# Patient Record
Sex: MALE | Race: BLACK OR AFRICAN AMERICAN | URBAN - METROPOLITAN AREA
[De-identification: names, ages, dates, MRNs, and addresses within clinical notes are randomized per-mention and may not be internally consistent; named-entity substitution may affect disease eponyms.]

---

## 2021-08-10 ENCOUNTER — APPOINTMENT (OUTPATIENT)
Dept: CT IMAGING | Age: 56
DRG: 720 | End: 2021-08-10
Payer: MEDICAID

## 2021-08-10 ENCOUNTER — HOSPITAL ENCOUNTER (INPATIENT)
Age: 56
LOS: 3 days | Discharge: HOME | DRG: 720 | End: 2021-08-13
Attending: EMERGENCY MEDICINE | Admitting: INTERNAL MEDICINE
Payer: MEDICAID

## 2021-08-10 ENCOUNTER — APPOINTMENT (OUTPATIENT)
Dept: ULTRASOUND IMAGING | Age: 56
DRG: 720 | End: 2021-08-10
Payer: MEDICAID

## 2021-08-10 DIAGNOSIS — N45.3 ORCHITIS AND EPIDIDYMITIS: Primary | ICD-10-CM

## 2021-08-10 DIAGNOSIS — N50.811 PAIN IN RIGHT TESTICLE: ICD-10-CM

## 2021-08-10 DIAGNOSIS — A41.9 SEPTICEMIA (HCC): ICD-10-CM

## 2021-08-10 LAB
A/G RATIO: 0.8 (ref 1.1–2.2)
ALBUMIN SERPL-MCNC: 3.5 G/DL (ref 3.4–5)
ALP BLD-CCNC: 99 U/L (ref 40–129)
ALT SERPL-CCNC: 10 U/L (ref 10–40)
AMPHETAMINE SCREEN, URINE: ABNORMAL
ANION GAP SERPL CALCULATED.3IONS-SCNC: 12 MMOL/L (ref 3–16)
AST SERPL-CCNC: 17 U/L (ref 15–37)
BACTERIA: ABNORMAL /HPF
BARBITURATE SCREEN URINE: ABNORMAL
BASOPHILS ABSOLUTE: 0.1 K/UL (ref 0–0.2)
BASOPHILS RELATIVE PERCENT: 0.3 %
BENZODIAZEPINE SCREEN, URINE: ABNORMAL
BILIRUB SERPL-MCNC: 0.3 MG/DL (ref 0–1)
BILIRUBIN URINE: NEGATIVE
BLOOD, URINE: ABNORMAL
BUN BLDV-MCNC: 7 MG/DL (ref 7–20)
CALCIUM SERPL-MCNC: 8.6 MG/DL (ref 8.3–10.6)
CANNABINOID SCREEN URINE: ABNORMAL
CHLORIDE BLD-SCNC: 98 MMOL/L (ref 99–110)
CLARITY: CLEAR
CO2: 22 MMOL/L (ref 21–32)
COCAINE METABOLITE SCREEN URINE: POSITIVE
COLOR: YELLOW
CREAT SERPL-MCNC: 0.7 MG/DL (ref 0.9–1.3)
EOSINOPHILS ABSOLUTE: 0.1 K/UL (ref 0–0.6)
EOSINOPHILS RELATIVE PERCENT: 0.5 %
EPITHELIAL CELLS, UA: 1 /HPF (ref 0–5)
GFR AFRICAN AMERICAN: >60
GFR NON-AFRICAN AMERICAN: >60
GLOBULIN: 4.5 G/DL
GLUCOSE BLD-MCNC: 107 MG/DL (ref 70–99)
GLUCOSE URINE: NEGATIVE MG/DL
HCT VFR BLD CALC: 36.5 % (ref 40.5–52.5)
HEMOGLOBIN: 12.1 G/DL (ref 13.5–17.5)
HYALINE CASTS: 1 /LPF (ref 0–8)
KETONES, URINE: NEGATIVE MG/DL
LACTIC ACID: 0.9 MMOL/L (ref 0.4–2)
LEUKOCYTE ESTERASE, URINE: ABNORMAL
LIPASE: 10 U/L (ref 13–60)
LYMPHOCYTES ABSOLUTE: 1 K/UL (ref 1–5.1)
LYMPHOCYTES RELATIVE PERCENT: 5.5 %
Lab: ABNORMAL
MCH RBC QN AUTO: 27.4 PG (ref 26–34)
MCHC RBC AUTO-ENTMCNC: 33 G/DL (ref 31–36)
MCV RBC AUTO: 83 FL (ref 80–100)
METHADONE SCREEN, URINE: ABNORMAL
MICROSCOPIC EXAMINATION: YES
MONOCYTES ABSOLUTE: 1.1 K/UL (ref 0–1.3)
MONOCYTES RELATIVE PERCENT: 5.8 %
NEUTROPHILS ABSOLUTE: 16.3 K/UL (ref 1.7–7.7)
NEUTROPHILS RELATIVE PERCENT: 87.9 %
NITRITE, URINE: NEGATIVE
OPIATE SCREEN URINE: ABNORMAL
OXYCODONE URINE: ABNORMAL
PDW BLD-RTO: 19.5 % (ref 12.4–15.4)
PH UA: 5
PH UA: 6 (ref 5–8)
PHENCYCLIDINE SCREEN URINE: ABNORMAL
PLATELET # BLD: 332 K/UL (ref 135–450)
PMV BLD AUTO: 7.7 FL (ref 5–10.5)
POTASSIUM REFLEX MAGNESIUM: 3.7 MMOL/L (ref 3.5–5.1)
PROPOXYPHENE SCREEN: ABNORMAL
PROTEIN UA: NEGATIVE MG/DL
RBC # BLD: 4.4 M/UL (ref 4.2–5.9)
RBC UA: 2 /HPF (ref 0–4)
SODIUM BLD-SCNC: 132 MMOL/L (ref 136–145)
SPECIFIC GRAVITY UA: 1.01 (ref 1–1.03)
TOTAL PROTEIN: 8 G/DL (ref 6.4–8.2)
URINE REFLEX TO CULTURE: YES
URINE TYPE: ABNORMAL
UROBILINOGEN, URINE: 0.2 E.U./DL
WBC # BLD: 18.5 K/UL (ref 4–11)
WBC UA: 16 /HPF (ref 0–5)

## 2021-08-10 PROCEDURE — 87086 URINE CULTURE/COLONY COUNT: CPT

## 2021-08-10 PROCEDURE — 2000000000 HC ICU R&B

## 2021-08-10 PROCEDURE — 6370000000 HC RX 637 (ALT 250 FOR IP): Performed by: EMERGENCY MEDICINE

## 2021-08-10 PROCEDURE — 2580000003 HC RX 258: Performed by: EMERGENCY MEDICINE

## 2021-08-10 PROCEDURE — 74177 CT ABD & PELVIS W/CONTRAST: CPT

## 2021-08-10 PROCEDURE — 83605 ASSAY OF LACTIC ACID: CPT

## 2021-08-10 PROCEDURE — 87591 N.GONORRHOEAE DNA AMP PROB: CPT

## 2021-08-10 PROCEDURE — 87040 BLOOD CULTURE FOR BACTERIA: CPT

## 2021-08-10 PROCEDURE — 6360000002 HC RX W HCPCS: Performed by: EMERGENCY MEDICINE

## 2021-08-10 PROCEDURE — 87150 DNA/RNA AMPLIFIED PROBE: CPT

## 2021-08-10 PROCEDURE — 36415 COLL VENOUS BLD VENIPUNCTURE: CPT

## 2021-08-10 PROCEDURE — 96375 TX/PRO/DX INJ NEW DRUG ADDON: CPT

## 2021-08-10 PROCEDURE — 93975 VASCULAR STUDY: CPT

## 2021-08-10 PROCEDURE — 6360000002 HC RX W HCPCS: Performed by: INTERNAL MEDICINE

## 2021-08-10 PROCEDURE — 85025 COMPLETE CBC W/AUTO DIFF WBC: CPT

## 2021-08-10 PROCEDURE — 87186 SC STD MICRODIL/AGAR DIL: CPT

## 2021-08-10 PROCEDURE — 76870 US EXAM SCROTUM: CPT

## 2021-08-10 PROCEDURE — 87077 CULTURE AEROBIC IDENTIFY: CPT

## 2021-08-10 PROCEDURE — 6360000004 HC RX CONTRAST MEDICATION: Performed by: EMERGENCY MEDICINE

## 2021-08-10 PROCEDURE — 99284 EMERGENCY DEPT VISIT MOD MDM: CPT

## 2021-08-10 PROCEDURE — 80307 DRUG TEST PRSMV CHEM ANLYZR: CPT

## 2021-08-10 PROCEDURE — 81001 URINALYSIS AUTO W/SCOPE: CPT

## 2021-08-10 PROCEDURE — 83690 ASSAY OF LIPASE: CPT

## 2021-08-10 PROCEDURE — 80053 COMPREHEN METABOLIC PANEL: CPT

## 2021-08-10 PROCEDURE — 96365 THER/PROPH/DIAG IV INF INIT: CPT

## 2021-08-10 PROCEDURE — 87491 CHLMYD TRACH DNA AMP PROBE: CPT

## 2021-08-10 RX ORDER — LEVOFLOXACIN 5 MG/ML
500 INJECTION, SOLUTION INTRAVENOUS ONCE
Status: COMPLETED | OUTPATIENT
Start: 2021-08-10 | End: 2021-08-10

## 2021-08-10 RX ORDER — OXYCODONE HYDROCHLORIDE 5 MG/1
5 TABLET ORAL EVERY 6 HOURS PRN
Status: CANCELLED | OUTPATIENT
Start: 2021-08-10

## 2021-08-10 RX ORDER — ACETAMINOPHEN 500 MG
1000 TABLET ORAL ONCE
Status: COMPLETED | OUTPATIENT
Start: 2021-08-10 | End: 2021-08-10

## 2021-08-10 RX ORDER — KETOROLAC TROMETHAMINE 30 MG/ML
30 INJECTION, SOLUTION INTRAMUSCULAR; INTRAVENOUS EVERY 6 HOURS PRN
Status: DISCONTINUED | OUTPATIENT
Start: 2021-08-10 | End: 2021-08-13 | Stop reason: HOSPADM

## 2021-08-10 RX ORDER — ONDANSETRON 2 MG/ML
4 INJECTION INTRAMUSCULAR; INTRAVENOUS
Status: DISCONTINUED | OUTPATIENT
Start: 2021-08-10 | End: 2021-08-11 | Stop reason: HOSPADM

## 2021-08-10 RX ORDER — MORPHINE SULFATE 2 MG/ML
2 INJECTION, SOLUTION INTRAMUSCULAR; INTRAVENOUS EVERY 4 HOURS PRN
Status: CANCELLED | OUTPATIENT
Start: 2021-08-10 | End: 2021-08-13

## 2021-08-10 RX ORDER — LEVOFLOXACIN 5 MG/ML
250 INJECTION, SOLUTION INTRAVENOUS ONCE
Status: COMPLETED | OUTPATIENT
Start: 2021-08-10 | End: 2021-08-10

## 2021-08-10 RX ORDER — SODIUM CHLORIDE 9 MG/ML
30 INJECTION, SOLUTION INTRAVENOUS ONCE
Status: COMPLETED | OUTPATIENT
Start: 2021-08-10 | End: 2021-08-10

## 2021-08-10 RX ORDER — MORPHINE SULFATE 4 MG/ML
4 INJECTION, SOLUTION INTRAMUSCULAR; INTRAVENOUS
Status: DISCONTINUED | OUTPATIENT
Start: 2021-08-10 | End: 2021-08-10

## 2021-08-10 RX ADMIN — LEVOFLOXACIN 500 MG: 500 INJECTION, SOLUTION INTRAVENOUS at 20:28

## 2021-08-10 RX ADMIN — ACETAMINOPHEN 1000 MG: 500 TABLET, FILM COATED ORAL at 18:56

## 2021-08-10 RX ADMIN — Medication 1000 MG: at 22:55

## 2021-08-10 RX ADMIN — LEVOFLOXACIN 250 MG: 5 INJECTION, SOLUTION INTRAVENOUS at 22:56

## 2021-08-10 RX ADMIN — Medication 1000 MG: at 20:22

## 2021-08-10 RX ADMIN — SODIUM CHLORIDE 2286 ML: 9 INJECTION, SOLUTION INTRAVENOUS at 18:54

## 2021-08-10 RX ADMIN — ONDANSETRON 4 MG: 2 INJECTION INTRAMUSCULAR; INTRAVENOUS at 18:55

## 2021-08-10 RX ADMIN — IOPAMIDOL 75 ML: 755 INJECTION, SOLUTION INTRAVENOUS at 20:03

## 2021-08-10 ASSESSMENT — PAIN SCALES - GENERAL
PAINLEVEL_OUTOF10: 10
PAINLEVEL_OUTOF10: 10

## 2021-08-10 NOTE — ED NOTES
Bed: 12  Expected date:   Expected time:   Means of arrival:   Comments:  chad Viramontes RN  08/10/21 6444

## 2021-08-10 NOTE — ED PROVIDER NOTES
[place] [address] [facility]:    [address] in the Last Year:    Transportation Needs:     Lack of Transportation (Medical):  Lack of Transportation (Non-Medical):    Physical Activity:     Days of Exercise per Week:     Minutes of Exercise per Session:    Stress:     Feeling of Stress :    Social Connections:     Frequency of Communication with Friends and Family:     Frequency of Social Gatherings with Friends and Family:     Attends Orthodoxy Services:     Active Member of Clubs or Organizations:     Attends Club or Organization Meetings:     Marital Status:    Intimate Partner Violence:     Fear of Current or Ex-Partner:     Emotionally Abused:     Physically Abused:     Sexually Abused:      Current Facility-Administered Medications   Medication Dose Route Frequency Provider Last Rate Last Admin    morphine injection 4 mg  4 mg Intravenous Q1H PRN Alesha Garcias MD        ondansetron [facility] injection 4 mg  4 mg Intravenous Q1H PRN Alesha Garcias MD   4 mg at 08/10/21 1855    levoFLOXacin (LEVAQUIN) 500 MG/100ML infusion 500 mg  500 mg Intravenous Once Alesha Garcias  mL/hr at 08/10/21 2028 500 mg at 08/10/21 2028     Current Outpatient Medications   Medication Sig Dispense Refill    Lansoprazole (PREVACID PO) Take by mouth       No Known Allergies    REVIEW OF SYSTEMS  10 systems reviewed, pertinent positives per HPI otherwise noted to be negative. PHYSICAL EXAM  /74   Pulse 102   Temp 99.4 °F (37.4 °C) (Oral)   Resp 15   Ht 5' 7\" (1.702 m)   Wt 168 lb (76.2 kg)   SpO2 96%   BMI 26.31 kg/m²    GENERAL APPEARANCE: Awake and alert. Cooperative. Uncomfortable appearing, mild distress. HENT: Normocephalic. Atraumatic. Mucous membranes are dry. NECK: Supple. EYES: PERRL. EOM's grossly intact. HEART/CHEST: Reg rhythm, tachycardia. No murmurs. No chest wall tenderness. LUNGS: Respirations unlabored. CTAB.  Good air exchange. Speaking comfortably in full sentences. ABDOMEN: Mild RLQ ttp and R flank/CVA ttp but no RUQ or any left sided abdominal flank or back tenderness. Soft. Non-distended. No masses. No organomegaly. No guarding or rebound. Normal bowel sounds throughout. : Right testicle is notably swollen tender and inflamed compared to the left with grossly normal lie. Positive cremasteric bilaterally. Left testicle is nontender and without mass. Right hemiscrotum is edematous and erythematous. There is inguinal lymphadenopathy present on the right. No inguinal hernias appreciated on exam.  MUSCULOSKELETAL: No extremity edema. Compartments soft. No deformity. No tenderness in the extremities. All extremities neurovascularly intact. SKIN: Warm and dry. No acute rashes. NEUROLOGICAL: Alert and oriented. CN's 2-12 intact. No gross facial drooping. Strength 5/5, sensation intact. 2 plus DTR's in knees bilaterally. Gait normal.  PSYCHIATRIC: Normal mood and affect. LABS  I have reviewed all labs for this visit.    Results for orders placed or performed during the hospital encounter of 08/10/21   Urinalysis Reflex to Culture    Specimen: Urine, clean catch   Result Value Ref Range    Color, UA YELLOW Straw/Yellow    Clarity, UA Clear Clear    Glucose, Ur Negative Negative mg/dL    Bilirubin Urine Negative Negative    Ketones, Urine Negative Negative mg/dL    Specific Gravity, UA 1.007 1.005 - 1.030    Blood, Urine SMALL (A) Negative    pH, UA 6.0 5.0 - 8.0    Protein, UA Negative Negative mg/dL    Urobilinogen, Urine 0.2 <2.0 E.U./dL    Nitrite, Urine Negative Negative    Leukocyte Esterase, Urine MODERATE (A) Negative    Microscopic Examination YES     Urine Type NotGiven     Urine Reflex to Culture Yes    CBC auto differential   Result Value Ref Range    WBC 18.5 (H) 4.0 - 11.0 K/uL    RBC 4.40 4.20 - 5.90 M/uL    Hemoglobin 12.1 (L) 13.5 - 17.5 g/dL    Hematocrit 36.5 (L) 40.5 - 52.5 %    MCV 83.0 80.0 - 100.0 fL    MCH 27.4 26.0 - 34.0 pg    MCHC 33.0 31.0 - 36.0 g/dL    RDW 19.5 (H) 12.4 - 15.4 %    Platelets 736 546 - 718 K/uL    MPV 7.7 5.0 - 10.5 fL    Neutrophils % 87.9 %    Lymphocytes % 5.5 %    Monocytes % 5.8 %    Eosinophils % 0.5 %    Basophils % 0.3 %    Neutrophils Absolute 16.3 (H) 1.7 - 7.7 K/uL    Lymphocytes Absolute 1.0 1.0 - 5.1 K/uL    Monocytes Absolute 1.1 0.0 - 1.3 K/uL    Eosinophils Absolute 0.1 0.0 - 0.6 K/uL    Basophils Absolute 0.1 0.0 - 0.2 K/uL   Comprehensive Metabolic Panel w/ Reflex to MG   Result Value Ref Range    Sodium 132 (L) 136 - 145 mmol/L    Potassium reflex Magnesium 3.7 3.5 - 5.1 mmol/L    Chloride 98 (L) 99 - 110 mmol/L    CO2 22 21 - 32 mmol/L    Anion Gap 12 3 - 16    Glucose 107 (H) 70 - 99 mg/dL    BUN 7 7 - 20 mg/dL    CREATININE 0.7 (L) 0.9 - 1.3 mg/dL    GFR Non-African American >60 >60    GFR African American >60 >60    Calcium 8.6 8.3 - 10.6 mg/dL    Total Protein 8.0 6.4 - 8.2 g/dL    Albumin 3.5 3.4 - 5.0 g/dL    Albumin/Globulin Ratio 0.8 (L) 1.1 - 2.2    Total Bilirubin 0.3 0.0 - 1.0 mg/dL    Alkaline Phosphatase 99 40 - 129 U/L    ALT 10 10 - 40 U/L    AST 17 15 - 37 U/L    Globulin 4.5 g/dL   Lipase   Result Value Ref Range    Lipase 10.0 (L) 13.0 - 60.0 U/L   Lactic Acid, Plasma   Result Value Ref Range    Lactic Acid 0.9 0.4 - 2.0 mmol/L   Microscopic Urinalysis   Result Value Ref Range    Bacteria, UA 2+ (A) None Seen /HPF    Hyaline Casts, UA 1 0 - 8 /LPF    WBC, UA 16 (H) 0 - 5 /HPF    RBC, UA 2 0 - 4 /HPF    Epithelial Cells, UA 1 0 - 5 /HPF       RADIOLOGY    US SCROTUM AND TESTICLES    Result Date: 8/10/2021  EXAMINATION: DOPPLER EVALUATION OF THE PELVIS; ULTRASOUND OF THE SCROTUM/TESTICLES WITH COLOR DOPPLER FLOW EVALUATION 8/10/2021 COMPARISON: None.  HISTORY: ORDERING SYSTEM PROVIDED HISTORY: R testicle pain and fever x2-3 days TECHNOLOGIST PROVIDED HISTORY: Reason for exam:->R testicle pain and fever x2-3 days; ORDERING SYSTEM PROVIDED HISTORY: R testicle pain and fever TECHNOLOGIST PROVIDED HISTORY: Reason for exam:->R testicle pain and fever FINDINGS: Measurements: Right testicle: 4.6 x 3.1 x 3.5 cm Left testicle: 4.1 x 2.9 x 3.1 cm Right: (Identified is more anterior) Grey scale:  No mass is identified. Doppler Evaluation:  Doppler flow is increased. Scrotal Sac: There is a small hydrocele. Epididymis: The epididymis is swollen with moderate hypervascularity. Left: (Identified is more posterior) Grey scale: The left testicle demonstrates normal homogeneous echotexture without focal lesion. No evidence of testicular microlithiasis. Doppler Evaluation:  There is normal arterial and venous Doppler flow within the testicle. Scrotal Sac: There is a small hydrocele. Epididymis:  No acute abnormality. No intratesticular masses. Findings consistent with right epididymo-orchitis. Small bilateral hydroceles. CT ABDOMEN PELVIS W IV CONTRAST Additional Contrast? None    Result Date: 8/10/2021  EXAMINATION: CT OF THE ABDOMEN AND PELVIS WITH CONTRAST 8/10/2021 7:56 pm TECHNIQUE: CT of the abdomen and pelvis was performed with the administration of intravenous contrast. Multiplanar reformatted images are provided for review. Dose modulation, iterative reconstruction, and/or weight based adjustment of the mA/kV was utilized to reduce the radiation dose to as low as reasonably achievable. COMPARISON: None. HISTORY: ORDERING SYSTEM PROVIDED HISTORY: fever, R groin/testicle pain TECHNOLOGIST PROVIDED HISTORY: Additional Contrast?->None Reason for exam:->fever, R groin/testicle pain Decision Support Exception - unselect if not a suspected or confirmed emergency medical condition->Emergency Medical Condition (MA) Reason for Exam: fever, R groin/testicle pain Acuity: Acute Type of Exam: Initial FINDINGS: Lower Chest: Air trapping is noted in the lung bases. There is mild cardiomegaly. Organs: The liver is homogeneous and normal in attenuation.   No gallbladder stones are seen. The pancreas, spleen and adrenal glands are unremarkable. The nephrograms are symmetrical.  There is no hydronephrosis or obstructing calculus. Cyst-like hypodensities in the kidneys measure up to 7 mm. No suspicious lesions are seen. GI/Bowel: The stomach is unremarkable. There is mild dilation of jejunal loops, without focal area of transition. There is mild diverticulosis of the descending and sigmoid colon. No pericolonic edema is seen. Pelvis: Urinary bladder is unremarkable. Prostate gland is mildly enlarged. Peritoneum/Retroperitoneum: There is no aneurysm, adenopathy or free fluid. Bones/Soft Tissues: At L5-S1 there is grade 1 spondylolisthesis with bilateral L5 pars defects. There is disproportionate spondylosis at this level. No acute fractures are identified. Abdominal wall is unremarkable. Mild dilation the proximal jejunum. This is nonspecific focal ileus versus enteritis. No urolithiasis or obstructive uropathy. Cardiomegaly. Mild enlargement of the prostate gland. US DUP ABD PEL RETRO SCROT COMPLETE    Result Date: 8/10/2021  EXAMINATION: DOPPLER EVALUATION OF THE PELVIS; ULTRASOUND OF THE SCROTUM/TESTICLES WITH COLOR DOPPLER FLOW EVALUATION 8/10/2021 COMPARISON: None. HISTORY: ORDERING SYSTEM PROVIDED HISTORY: R testicle pain and fever x2-3 days TECHNOLOGIST PROVIDED HISTORY: Reason for exam:->R testicle pain and fever x2-3 days; ORDERING SYSTEM PROVIDED HISTORY: R testicle pain and fever TECHNOLOGIST PROVIDED HISTORY: Reason for exam:->R testicle pain and fever FINDINGS: Measurements: Right testicle: 4.6 x 3.1 x 3.5 cm Left testicle: 4.1 x 2.9 x 3.1 cm Right: (Identified is more anterior) Grey scale:  No mass is identified. Doppler Evaluation:  Doppler flow is increased. Scrotal Sac: There is a small hydrocele. Epididymis: The epididymis is swollen with moderate hypervascularity. Left: (Identified is more posterior) Grey scale:   The left testicle demonstrates normal homogeneous echotexture without focal lesion. No evidence of testicular microlithiasis. Doppler Evaluation:  There is normal arterial and venous Doppler flow within the testicle. Scrotal Sac: There is a small hydrocele. Epididymis:  No acute abnormality. No intratesticular masses. Findings consistent with right epididymo-orchitis. Small bilateral hydroceles. ED COURSE/[facility]  Patient seen and evaluated. Old records reviewed. Labs and imaging reviewed and results discussed with patient. After initial evaluation, differential diagnostic considerations included: UTI, sexually transmitted infection, epididymitis, balanitis, prostatitis, inguinal hernia, testicular torsion, varicocele, hydrocele, appy    The patient's ED workup was notable for concern for right epididymitis/orchitis without torsion, no abnormal mass. Patient had significant fever, leukocytosis and infected appearing urinalysis although patient denies any concern for STD. Gonorrhea chlamydia cultures are pending. No symptoms on the left testicle currently. CAT scan is also unremarkable but ultrasound does demonstrate the above-mentioned findings. Patient has a normal lactate and stable blood pressure with no other endorgan dysfunction. Covering with rocephin/levaquin for now. Urology will be consulted as well.     SEP-1 CORE MEASURE DATA    Classification: exclude from core measure    Exclusion criteria: the patient is NOT to be included for sepsis due to:  Patient has sepsis and no end-organ dysfunction is identified and so is not to be included    During the patient's ED course, the patient was given:  Medications   morphine injection 4 mg (0 mg Intravenous Held 8/10/21 1855)   ondansetron (ZOFRAN) injection 4 mg (4 mg Intravenous Given 8/10/21 1855)   levoFLOXacin (LEVAQUIN) 500 MG/100ML infusion 500 mg (500 mg Intravenous New Bag 8/10/21 2028)   0.9 % sodium chloride infusion (2,286 mLs Intravenous New Bag 8/10/21 1854) acetaminophen (TYLENOL) tablet 1,000 mg (1,000 mg Oral Given 8/10/21 1856)   iopamidol (ISOVUE-370) 76 % injection 75 mL (75 mLs Intravenous Given 8/10/21 2003)   cefTRIAXone (ROCEPHIN) 1000 mg in sterile water 10 mL IV syringe (1,000 mg Intravenous Given 8/10/21 2022)        CLINICAL IMPRESSION  1. Orchitis and epididymitis    2. Pain in right testicle    3. Septicemia (HCC)        Blood pressure 130/74, pulse 102, temperature 99.4 °F (37.4 °C), temperature source Oral, resp. rate 15, height 5' 7\" (1.702 m), weight 168 lb (76.2 kg), SpO2 96 %. DISPOSITION  Venus Boeck was admitted in fair condition. The plan is to admit to the hospital at this time under the hospitalist service. Hospitalist accepted the patient and will take over the patient's care. DISCLAIMER: This chart was created using Dragon dictation software. Efforts were made by me to ensure accuracy, however some errors may be present due to limitations of this technology and occasionally words are not transcribed correctly.         Efraín Goode MD  08/10/21 2750

## 2021-08-11 LAB
A/G RATIO: 0.7 (ref 1.1–2.2)
ALBUMIN SERPL-MCNC: 3 G/DL (ref 3.4–5)
ALP BLD-CCNC: 93 U/L (ref 40–129)
ALT SERPL-CCNC: 7 U/L (ref 10–40)
ANION GAP SERPL CALCULATED.3IONS-SCNC: 11 MMOL/L (ref 3–16)
AST SERPL-CCNC: 12 U/L (ref 15–37)
BASE EXCESS ARTERIAL: -1 (ref -3–3)
BASOPHILS ABSOLUTE: 0.1 K/UL (ref 0–0.2)
BASOPHILS RELATIVE PERCENT: 0.3 %
BILIRUB SERPL-MCNC: 0.5 MG/DL (ref 0–1)
BUN BLDV-MCNC: 4 MG/DL (ref 7–20)
C-REACTIVE PROTEIN: 210.1 MG/L (ref 0–5.1)
C. TRACHOMATIS DNA ,URINE: NEGATIVE
CALCIUM IONIZED: 1.18 MMOL/L (ref 1.12–1.32)
CALCIUM SERPL-MCNC: 8.5 MG/DL (ref 8.3–10.6)
CHLORIDE BLD-SCNC: 105 MMOL/L (ref 99–110)
CO2: 23 MMOL/L (ref 21–32)
CREAT SERPL-MCNC: 0.6 MG/DL (ref 0.9–1.3)
EOSINOPHILS ABSOLUTE: 0.1 K/UL (ref 0–0.6)
EOSINOPHILS RELATIVE PERCENT: 0.2 %
GFR AFRICAN AMERICAN: >60
GFR NON-AFRICAN AMERICAN: >60
GLOBULIN: 4.2 G/DL
GLUCOSE BLD-MCNC: 89 MG/DL (ref 70–99)
GLUCOSE BLD-MCNC: 94 MG/DL (ref 70–99)
HBV SURFACE AB TITR SER: <3.5 MIU/ML
HCO3 ARTERIAL: 24 MMOL/L (ref 21–29)
HCT VFR BLD CALC: 34.4 % (ref 40.5–52.5)
HEMOGLOBIN: 11.1 G/DL (ref 13.5–17.5)
HEMOGLOBIN: 13.1 GM/DL (ref 13.5–17.5)
LACTATE: 0.71 MMOL/L (ref 0.4–2)
LACTIC ACID: 1.2 MMOL/L (ref 0.4–2)
LYMPHOCYTES ABSOLUTE: 1.7 K/UL (ref 1–5.1)
LYMPHOCYTES RELATIVE PERCENT: 4.5 %
MCH RBC QN AUTO: 27 PG (ref 26–34)
MCHC RBC AUTO-ENTMCNC: 32.3 G/DL (ref 31–36)
MCV RBC AUTO: 83.5 FL (ref 80–100)
MONOCYTES ABSOLUTE: 1.8 K/UL (ref 0–1.3)
MONOCYTES RELATIVE PERCENT: 4.8 %
N. GONORRHOEAE DNA, URINE: NEGATIVE
NEUTROPHILS ABSOLUTE: 33.9 K/UL (ref 1.7–7.7)
NEUTROPHILS RELATIVE PERCENT: 90.2 %
O2 SAT, ARTERIAL: 96 % (ref 93–100)
PCO2 ARTERIAL: 38.5 MM HG (ref 35–45)
PDW BLD-RTO: 18.8 % (ref 12.4–15.4)
PERFORMED ON: ABNORMAL
PH ARTERIAL: 7.4 (ref 7.35–7.45)
PLATELET # BLD: 322 K/UL (ref 135–450)
PMV BLD AUTO: 7 FL (ref 5–10.5)
PO2 ARTERIAL: 82.9 MM HG (ref 75–108)
POC HEMATOCRIT: 38 % (ref 40.5–52.5)
POC POTASSIUM: 3.5 MMOL/L (ref 3.5–5.1)
POC SAMPLE TYPE: ABNORMAL
POC SODIUM: 142 MMOL/L (ref 136–145)
POTASSIUM REFLEX MAGNESIUM: 3.7 MMOL/L (ref 3.5–5.1)
RBC # BLD: 4.12 M/UL (ref 4.2–5.9)
SEDIMENTATION RATE, ERYTHROCYTE: 98 MM/HR (ref 0–20)
SODIUM BLD-SCNC: 139 MMOL/L (ref 136–145)
TCO2 ARTERIAL: 25 MMOL/L
TOTAL PROTEIN: 7.2 G/DL (ref 6.4–8.2)
WBC # BLD: 37.5 K/UL (ref 4–11)

## 2021-08-11 PROCEDURE — 6360000002 HC RX W HCPCS: Performed by: INTERNAL MEDICINE

## 2021-08-11 PROCEDURE — 90471 IMMUNIZATION ADMIN: CPT | Performed by: INTERNAL MEDICINE

## 2021-08-11 PROCEDURE — 85014 HEMATOCRIT: CPT

## 2021-08-11 PROCEDURE — 99255 IP/OBS CONSLTJ NEW/EST HI 80: CPT | Performed by: INTERNAL MEDICINE

## 2021-08-11 PROCEDURE — 90715 TDAP VACCINE 7 YRS/> IM: CPT | Performed by: INTERNAL MEDICINE

## 2021-08-11 PROCEDURE — 6370000000 HC RX 637 (ALT 250 FOR IP): Performed by: INTERNAL MEDICINE

## 2021-08-11 PROCEDURE — 84132 ASSAY OF SERUM POTASSIUM: CPT

## 2021-08-11 PROCEDURE — 84295 ASSAY OF SERUM SODIUM: CPT

## 2021-08-11 PROCEDURE — 85652 RBC SED RATE AUTOMATED: CPT

## 2021-08-11 PROCEDURE — 82803 BLOOD GASES ANY COMBINATION: CPT

## 2021-08-11 PROCEDURE — 36415 COLL VENOUS BLD VENIPUNCTURE: CPT

## 2021-08-11 PROCEDURE — 2580000003 HC RX 258: Performed by: INTERNAL MEDICINE

## 2021-08-11 PROCEDURE — 80053 COMPREHEN METABOLIC PANEL: CPT

## 2021-08-11 PROCEDURE — 86803 HEPATITIS C AB TEST: CPT

## 2021-08-11 PROCEDURE — 86780 TREPONEMA PALLIDUM: CPT

## 2021-08-11 PROCEDURE — 85025 COMPLETE CBC W/AUTO DIFF WBC: CPT

## 2021-08-11 PROCEDURE — 2000000000 HC ICU R&B

## 2021-08-11 PROCEDURE — 2700000000 HC OXYGEN THERAPY PER DAY

## 2021-08-11 PROCEDURE — 83605 ASSAY OF LACTIC ACID: CPT

## 2021-08-11 PROCEDURE — 86702 HIV-2 ANTIBODY: CPT

## 2021-08-11 PROCEDURE — 99223 1ST HOSP IP/OBS HIGH 75: CPT | Performed by: INTERNAL MEDICINE

## 2021-08-11 PROCEDURE — 86701 HIV-1ANTIBODY: CPT

## 2021-08-11 PROCEDURE — 94761 N-INVAS EAR/PLS OXIMETRY MLT: CPT

## 2021-08-11 PROCEDURE — 87390 HIV-1 AG IA: CPT

## 2021-08-11 PROCEDURE — 82947 ASSAY GLUCOSE BLOOD QUANT: CPT

## 2021-08-11 PROCEDURE — 86706 HEP B SURFACE ANTIBODY: CPT

## 2021-08-11 PROCEDURE — 82330 ASSAY OF CALCIUM: CPT

## 2021-08-11 PROCEDURE — 86140 C-REACTIVE PROTEIN: CPT

## 2021-08-11 RX ORDER — MAGNESIUM HYDROXIDE/ALUMINUM HYDROXICE/SIMETHICONE 120; 1200; 1200 MG/30ML; MG/30ML; MG/30ML
30 SUSPENSION ORAL EVERY 6 HOURS PRN
Status: DISCONTINUED | OUTPATIENT
Start: 2021-08-11 | End: 2021-08-13 | Stop reason: HOSPADM

## 2021-08-11 RX ORDER — CALCIUM CARBONATE 200(500)MG
500 TABLET,CHEWABLE ORAL 3 TIMES DAILY PRN
Status: DISCONTINUED | OUTPATIENT
Start: 2021-08-11 | End: 2021-08-13 | Stop reason: HOSPADM

## 2021-08-11 RX ORDER — ACETAMINOPHEN 650 MG/1
650 SUPPOSITORY RECTAL EVERY 6 HOURS PRN
Status: DISCONTINUED | OUTPATIENT
Start: 2021-08-11 | End: 2021-08-13 | Stop reason: HOSPADM

## 2021-08-11 RX ORDER — SODIUM CHLORIDE 0.9 % (FLUSH) 0.9 %
5-40 SYRINGE (ML) INJECTION EVERY 12 HOURS SCHEDULED
Status: DISCONTINUED | OUTPATIENT
Start: 2021-08-11 | End: 2021-08-13 | Stop reason: HOSPADM

## 2021-08-11 RX ORDER — LEVOFLOXACIN 5 MG/ML
750 INJECTION, SOLUTION INTRAVENOUS EVERY 24 HOURS
Status: DISCONTINUED | OUTPATIENT
Start: 2021-08-11 | End: 2021-08-11

## 2021-08-11 RX ORDER — LANSOPRAZOLE 15 MG/1
30 CAPSULE, DELAYED RELEASE ORAL
Status: DISCONTINUED | OUTPATIENT
Start: 2021-08-11 | End: 2021-08-11 | Stop reason: CLARIF

## 2021-08-11 RX ORDER — SODIUM CHLORIDE, SODIUM LACTATE, POTASSIUM CHLORIDE, CALCIUM CHLORIDE 600; 310; 30; 20 MG/100ML; MG/100ML; MG/100ML; MG/100ML
INJECTION, SOLUTION INTRAVENOUS CONTINUOUS
Status: ACTIVE | OUTPATIENT
Start: 2021-08-11 | End: 2021-08-12

## 2021-08-11 RX ORDER — SODIUM CHLORIDE 0.9 % (FLUSH) 0.9 %
10 SYRINGE (ML) INJECTION PRN
Status: DISCONTINUED | OUTPATIENT
Start: 2021-08-11 | End: 2021-08-13 | Stop reason: HOSPADM

## 2021-08-11 RX ORDER — PANTOPRAZOLE SODIUM 40 MG/1
40 TABLET, DELAYED RELEASE ORAL
Status: DISCONTINUED | OUTPATIENT
Start: 2021-08-11 | End: 2021-08-13 | Stop reason: HOSPADM

## 2021-08-11 RX ORDER — POLYETHYLENE GLYCOL 3350 17 G/17G
17 POWDER, FOR SOLUTION ORAL DAILY PRN
Status: DISCONTINUED | OUTPATIENT
Start: 2021-08-11 | End: 2021-08-13 | Stop reason: HOSPADM

## 2021-08-11 RX ORDER — ONDANSETRON 2 MG/ML
4 INJECTION INTRAMUSCULAR; INTRAVENOUS EVERY 6 HOURS PRN
Status: DISCONTINUED | OUTPATIENT
Start: 2021-08-11 | End: 2021-08-13 | Stop reason: HOSPADM

## 2021-08-11 RX ORDER — ACETAMINOPHEN 325 MG/1
650 TABLET ORAL EVERY 6 HOURS PRN
Status: DISCONTINUED | OUTPATIENT
Start: 2021-08-11 | End: 2021-08-13 | Stop reason: HOSPADM

## 2021-08-11 RX ORDER — ONDANSETRON 4 MG/1
4 TABLET, ORALLY DISINTEGRATING ORAL EVERY 8 HOURS PRN
Status: DISCONTINUED | OUTPATIENT
Start: 2021-08-11 | End: 2021-08-13 | Stop reason: HOSPADM

## 2021-08-11 RX ORDER — LINEZOLID 2 MG/ML
600 INJECTION, SOLUTION INTRAVENOUS EVERY 12 HOURS
Status: DISCONTINUED | OUTPATIENT
Start: 2021-08-11 | End: 2021-08-12

## 2021-08-11 RX ORDER — SODIUM CHLORIDE 9 MG/ML
25 INJECTION, SOLUTION INTRAVENOUS PRN
Status: DISCONTINUED | OUTPATIENT
Start: 2021-08-11 | End: 2021-08-13 | Stop reason: HOSPADM

## 2021-08-11 RX ADMIN — Medication 10 ML: at 08:32

## 2021-08-11 RX ADMIN — SODIUM CHLORIDE, POTASSIUM CHLORIDE, SODIUM LACTATE AND CALCIUM CHLORIDE: 600; 310; 30; 20 INJECTION, SOLUTION INTRAVENOUS at 10:07

## 2021-08-11 RX ADMIN — LINEZOLID 600 MG: 600 INJECTION, SOLUTION INTRAVENOUS at 16:00

## 2021-08-11 RX ADMIN — PIPERACILLIN AND TAZOBACTAM 3375 MG: 3; .375 INJECTION, POWDER, LYOPHILIZED, FOR SOLUTION INTRAVENOUS at 21:22

## 2021-08-11 RX ADMIN — KETOROLAC TROMETHAMINE 30 MG: 30 INJECTION, SOLUTION INTRAMUSCULAR; INTRAVENOUS at 21:06

## 2021-08-11 RX ADMIN — TETANUS TOXOID, REDUCED DIPHTHERIA TOXOID AND ACELLULAR PERTUSSIS VACCINE, ADSORBED 0.5 ML: 5; 2.5; 8; 8; 2.5 SUSPENSION INTRAMUSCULAR at 15:14

## 2021-08-11 RX ADMIN — Medication 10 ML: at 21:33

## 2021-08-11 RX ADMIN — ANTACID TABLETS 500 MG: 500 TABLET, CHEWABLE ORAL at 16:49

## 2021-08-11 RX ADMIN — PIPERACILLIN AND TAZOBACTAM 3375 MG: 3; .375 INJECTION, POWDER, LYOPHILIZED, FOR SOLUTION INTRAVENOUS at 15:11

## 2021-08-11 RX ADMIN — SODIUM CHLORIDE, POTASSIUM CHLORIDE, SODIUM LACTATE AND CALCIUM CHLORIDE: 600; 310; 30; 20 INJECTION, SOLUTION INTRAVENOUS at 02:01

## 2021-08-11 RX ADMIN — ALUMINUM HYDROXIDE, MAGNESIUM HYDROXIDE, AND SIMETHICONE 30 ML: 200; 200; 20 SUSPENSION ORAL at 13:21

## 2021-08-11 RX ADMIN — ANTACID TABLETS 500 MG: 500 TABLET, CHEWABLE ORAL at 21:07

## 2021-08-11 RX ADMIN — ENOXAPARIN SODIUM 40 MG: 40 INJECTION SUBCUTANEOUS at 08:31

## 2021-08-11 RX ADMIN — PANTOPRAZOLE SODIUM 40 MG: 40 TABLET, DELAYED RELEASE ORAL at 13:21

## 2021-08-11 ASSESSMENT — PAIN SCALES - GENERAL
PAINLEVEL_OUTOF10: 0
PAINLEVEL_OUTOF10: 7

## 2021-08-11 ASSESSMENT — PAIN DESCRIPTION - LOCATION: LOCATION: ABDOMEN;SCROTUM

## 2021-08-11 ASSESSMENT — PAIN DESCRIPTION - ORIENTATION: ORIENTATION: RIGHT

## 2021-08-11 NOTE — PROGRESS NOTES
Patient more alert at this time. Cleaned up. Ate some breakfast. Denied wanting to get up to the chair at this time.

## 2021-08-11 NOTE — CARE COORDINATION
Discharge Planning Assessment  Discharge Planning Assessment  RN/SW discharge planner met with patient/ and sisters Naya Hernandez to discuss reason for admission, current living situation, and potential needs at the time of discharge    Demographics/Insurance verified Yes no insurance, financial involved,  Residence: [facility]     Current type of dwellin story house    Patient from ECF/SW confirmed with: NA    Living arrangements: lives with his GF    Level of function/Support: Amb/Ind    PCP: none- list given    Last Visit to PCP: [facility] long time    DME: none    Active with any community resources/agencies/skilled home care: NA    Medication compliance issues: NA    Financial issues that could impact healthcare: NA        Tentative discharge plan: TBD        Discussed with patient and/or family that on the day of discharge home tentative time of discharge will be between 10 AM and noon.     Transportation at the time of discharge: Pt's sister    Pepper Olivier RN CM

## 2021-08-11 NOTE — PROGRESS NOTES
4 Eyes Skin Assessment     NAME:  Ava Paz  YOB: 1965  MEDICAL RECORD NUMBER:  [id number]    The patient is being assess for  Admission    I agree that 2 RN's have performed a thorough Head to Toe Skin Assessment on the patient. ALL assessment sites listed below have been assessed. Areas assessed by both nurses:    Head, Face, Ears, Shoulders, Back, Chest, Arms, Elbows, Hands, Sacrum. Buttock, Coccyx, Ischium and Legs. Feet and Heels        Does the Patient have a Wound?  No noted wound(s)       Harsh Prevention initiated:  Yes   Wound Care Orders initiated:  No    Pressure Injury (Stage 3,4, Unstageable, DTI, NWPT, and Complex wounds) if present place consult order under [de-identified] No    New and Established Ostomies if present place consult order under : No      Nurse 1 eSignature: Electronically signed by Mariana Painting RN on 8/11/21 at 2:24 AM EDT    **SHARE this note so that the co-signing nurse is able to place an eSignature**    Nurse 2 eSignature: Electronically signed by Tea Sky RN on 8/11/21 at 6:09 AM EDT

## 2021-08-11 NOTE — CONSULTS
Infectious Diseases   Consult Note        Admission Date: 8/10/2021  Hospital Day: Hospital Day: 2   Attending: Anam Saba MD  Date of service: 8/11/21     Reason for admission: Epididymo-orchitis [N45.3]  Orchitis and epididymitis [N45.3]  Septicemia [facility]) [A41.9]  Pain in right testicle [N50.811]    Chief complaint/ Reason for consult: Sepsis, epididymoorchitis    Microbiology:        I have reviewed allavailable micro lab data and cultures    · Blood culture (2/2) - collected on 8/10/2021: In process      Antibiotics and immunizations:       Current antibiotics: All antibiotics and their doses were reviewed by me    Recent Abx Admin                   levoFLOXacin (LEVAQUIN) 250 MG/50ML infusion 250 mg (mg) 250 mg New Bag 08/10/21 2256    cefTRIAXone (ROCEPHIN) 1000 mg in sterile water 10 mL IV syringe (mg) 1,000 mg Given 08/10/21 2255    levoFLOXacin (LEVAQUIN) 500 MG/100ML infusion 500 mg (mg) 500 mg New Bag 08/10/21 2028    cefTRIAXone (ROCEPHIN) 1000 mg in sterile water 10 mL IV syringe (mg) 1,000 mg Given 08/10/21 2022                  Immunization History: All immunization history was reviewed by me today. There is no immunization history for the selected administration types on file for this patient. Known drug allergies: All allergies were reviewed and updated    No Known Allergies    Social history:     Social History:  All social andepidemiologic history was reviewed and updated by me today as needed. · Tobacco use:   reports that he has been smoking cigarettes. He has never used smokeless tobacco.  · Alcohol use:   reports current alcohol use. · Currently lives in: [address] [place] [address]  ·  reports no history of drug use.      COVID VACCINATION AND LAB RESULT RECORDS:     Internal Administration   First Dose      Second Dose           Last COVID Lab No results found for: SARS-COV-2, SARS-COV-2 RNA, SARS-COV-2, SARS-COV-2, SARS-COV-2 BY PCR, SARS-COV-2, SARS-COV-2, SARS-COV-2 Assessment:     The patient is a 60 y.o. old male who  has no past medical history on file. with following problems:    · Sepsis with high fever, worsening bandemia, tachypnea, metabolic encephalopathy  · Right epididymoorchitis  · Complicated urinary tract infection  · Cocaine abuse  · Need for HIV screen  · Need for hepatitis B and C screen  · Overweight due to excess calorie intake : Body mass index is 25.93 kg/m². Discussion:      The patient has positive urine drug screen for cocaine. He has been admitted with high fever and has severe right testicular pain. His white cell count was 18,500 today and has gone up to 37,500 despite being on ceftriaxone and levofloxacin. I reviewed the nasal testicle ultrasound. It is concerning for right epididymoorchitis. Plan:     Diagnostic Workup:    · Agree with blood cultures  · Order serum lactate level stat  · Add sed rate and CRP to morning labs  · We will order baseline HIV screen  · Follow-up on urine gonorrhea and chlamydia NAAT  · We will also order syphilis screen for thoroughness  · We will order hepatitis B and C serology  · Agree with urine culture. We will follow up  · Continue to follow fever curve, WBC count and blood cultures  · Follow up on liverand renal functions closely  · We will order daily CBC and CMP    Antimicrobials:    · Will stop IV ceftriaxone today  · We will stop IV levofloxacin today  · Will order empiric IV linezolid 600 mg every 12 hour as in addition to gram-negative organisms, Staph aureus can also sometimes cause that. Myositis. Patient is an active cocaine user and  remains at risk of gram-positive bacteremia's  · Start IV Zosyn 3.375 g every 8 hours  Recommend holding off on PICC line until blood cultures clear for at least 48 and preferably 72 hours. No record of tetanus booster within past decade. Will order one today.   · Midline or regular central venous line okay for now, if needed for IV access. · We will follow up on the culture results and clinical progress and will make further recommendations accordingly. · Continue close vitals monitoring. · Maintain good glycemic control. · Fall precautions. Aspiration precautions. · Continue to watch for new fever or diarrhea. · DVT prophylaxis. · Continue close monitoring in ICU      Drug Monitoring:    · Continue serial monitoring for antibiotic toxicity as follows: *CBC, CMP  · Continue to watch for following: new or worsening fever, hypotension, hives, lip swelling and redness or purulence at vascular access sites. I/v access Management:    · Continue to monitor i.v access sites for erythema, induration, discharge or tenderness. · As always, continue efforts to minimizetubes/lines/drains as clinically appropriate to reduce chances of line associated infections. Current isolation precautions: There are no current isolations documented for this patient. Level of complexity of consult: High     Risk of Complications/Morbidity: High     · Illness(es)/ Infection present that pose threat to life/bodily function. · There is potential for severe exacerbation of infection/side effects of treatment. · Therapy requires intensive monitoring for antimicrobial agent toxicity. Thank you for involving me in the care of your patient. I will continue to follow. If you have any additional questions, please do not hesitate to contact me. Subjective:     Presenting complaint in ER:     Chief Complaint   Patient presents with    Testicle Pain     Pt with c/o swelling in right testicle x3 days. States pain is radiating up into his abdomen. HPI: James Wilkerson is a 60 y.o. male patient, who was seen at the request of Dr. Albino Johnson MD.    History was obtained from chart review as the patient is encephalopathic    The patient was admitted on 8/10/2021. I have been consulted to see the patient for above mentioned reason(s).     The patient has multiple medical comorbidities, and presented to the ER for worsening right testicular pain that was radiating to the right lower quadrant. His right testicle area was noted to be swollen with hemiscrotal edema in the ER. In the ER, he was noted to have a high fever of 102. His white cell count was 18,500. The patient was hospitalized. The white cell count has gone up to 37,500 today. Blood cultures were sent. Urine drug screen was positive for cocaine. CT scan of abdomen pelvis with IV contrast showed enlarged prostate gland. Testicular ultrasound revealed a right epididymoorchitis. I have been asked for my opinion for management for this patient. Past Medical History: All past medical history reviewed today. History reviewed. No pertinent past medical history. Past Surgical History: All pastsurgical history was reviewed today. History reviewed. No pertinent surgical history. Family History: All family history was reviewed today. Family history unknown: Yes         Medications: All current and past medications were reviewed.     Medications Prior to Admission: Lansoprazole (PREVACID PO), Take by mouth     pantoprazole  40 mg Oral QAM AC    sodium chloride flush  5-40 mL Intravenous 2 times per day    enoxaparin  40 mg Subcutaneous Daily    piperacillin-tazobactam  3,375 mg Intravenous Q8H    linezolid  600 mg Intravenous Q12H    Tdap-Dtap  0.5 mL Intramuscular Once          REVIEW OF SYSTEMS:       Review of Systems   Unable to perform ROS: Mental status change          Objective:       PHYSICAL EXAM:      Vitals:   Vitals:    08/11/21 0600 08/11/21 0615 08/11/21 0800 08/11/21 0939   BP: (!) 147/64  (!) 154/71    Pulse: 75 79 69    Resp: 17 17 15 22   Temp:   98 °F (36.7 °C)    TempSrc:   Temporal    SpO2: 94% 94% 98% 92%   Weight:       Height:           Physical Exam      General: Encephalopathic but protecting the airway so far,   HEENT: normocephalic, atraumatic, sclera clear, pupils equal, light reflex preserved bilaterally  Cardiovascular: RRR, no murmurs/rubs/gallops detected  Pulmonary: CTABL, no rhonchi/rales   Abdomen/GI: soft, no organomegaly, bowel sounds positive  Neuro: Encephalopathic, pupils are equal and reactive to light, moves all extremities  Skin: no rash,   Musculoskeletal:  No obvious joint swelling, redness. No limitation of range of passive motion  Genitourinary: right hemiscrotum swelling and tenderness noted  Psych: could not assess  Lymphatic/Immunologic: No obvious bruising, no cervical lymphadenopathy    Lines: All vascular access sites are healthy with no local erythema, discharge or tenderness    Intake and output:     I/O last 3 completed shifts: In: 424.2 [I.V.:330; IV Piggyback:94.2]  Out: 550 [Urine:550]    Lab Data:   All available labs were reviewed by me today. CBC:   Recent Labs     08/10/21  1808 08/11/21  0649   WBC 18.5* 37.5*   RBC 4.40 4.12*   HGB 12.1* 11.1*   HCT 36.5* 34.4*    322   MCV 83.0 83.5   MCH 27.4 27.0   MCHC 33.0 32.3   RDW 19.5* 18.8*        BMP:  Recent Labs     08/10/21  1808 08/11/21  0649   * 139   K 3.7 3.7   CL 98* 105   CO2 22 23   BUN 7 4*   CREATININE 0.7* 0.6*   CALCIUM 8.6 8.5   GLUCOSE 107* 89        Hepatic FunctionPanel:   Lab Results   Component Value Date    ALKPHOS 93 08/11/2021    ALT 7 08/11/2021    AST 12 08/11/2021    PROT 7.2 08/11/2021    BILITOT 0.5 08/11/2021    LABALBU 3.0 08/11/2021       CPK: No results found for: CKTOTAL  ESR: No results found for: SEDRATE  CRP: No results found for: CRP      Imaging: All pertinent images and reports for the current visit were reviewed by meduring this visit. CT ABDOMEN PELVIS W IV CONTRAST Additional Contrast? None   Final Result   Mild dilation the proximal jejunum. This is nonspecific focal ileus versus   enteritis. No urolithiasis or obstructive uropathy. Cardiomegaly.       Mild enlargement of the prostate gland. US DUP ABD PEL RETRO SCROT COMPLETE   Final Result   No intratesticular masses. Findings consistent with right epididymo-orchitis. Small bilateral hydroceles. US SCROTUM AND TESTICLES   Final Result   No intratesticular masses. Findings consistent with right epididymo-orchitis. Small bilateral hydroceles. Outside records:    Labs, Microbiology, Radiology and pertinent results from Care everywhere, if available, were reviewed as a part ofthe consultation. Problem list:       Patient Active Problem List   Diagnosis Code    Epididymo-orchitis N45.3    Pain in right testicle N50.811    Septicemia ([facility] [address] A41.9    Cocaine abuse ([facility] [address] F14.10    Screen for STD (sexually transmitted disease) Z11.3    Screening for HIV (human immunodeficiency virus) Z11.4    Need for hepatitis B screening test Z11.59    Encounter for hepatitis C virus screening test for high risk patient Z11.59, Z92.60    Complicated UTI (urinary tract infection) N39.0    Need for diphtheria-tetanus-pertussis (Tdap) vaccine Z23         Please note that this chart was generated using Dragon dictation software. Although every effort was made to ensure the accuracy of this automated transcription, some errors in transcription may have occurred inadvertently. If you may need any clarification, please do not hesitate to contact me through EPIC or at the phone number provided below with my electronic signature. Any pictures or media included in this note were obtained after taking informed verbal consent from the patient and with their approval to include those in the patient's medical record.       Maulik Keen MD, MPH  8/11/21, 11:09 AM EDT   [facility]   [address] [place] [address], [address] [place] [address]  Office: [phone]  Fax: [phone]  Clinic days:  Tuesday & Thursday

## 2021-08-11 NOTE — PROGRESS NOTES
Hospitalist Progress Note      PCP: Cynthia Foss    Date of Admission: 8/10/2021    Chief Complaint: Testicular pain    Hospital Course: 60 y.o. male with no significant past medical history, who denies sexual intercourse with multiple partners, who presents to the emergency room with complaints of right testicular swelling, pain, ongoing for the past 3 days. He was noted to have fever as high as 102 °F in the emergency room. Scrotal ultrasound is consistent with right epididymo-orchitis. Patient is noted to be confused, lethargic at the time of evaluation. Much of HPI obtained from review of electronic medical records as patient is unable to provide relevant HPI due to lethargy. Patient is being admitted for further management. Subjective: Patient seen and examined at bedside. Alert and oriented this morning, however, his sensorium still appears to be altered. Continues to complain of significant testicular pain. Medications:  Reviewed    Infusion Medications    lactated ringers 125 mL/hr at 08/11/21 1007    sodium chloride       Scheduled Medications    pantoprazole  40 mg Oral QAM AC    sodium chloride flush  5-40 mL Intravenous 2 times per day    enoxaparin  40 mg Subcutaneous Daily    piperacillin-tazobactam  3,375 mg Intravenous Q8H    linezolid  600 mg Intravenous Q12H     PRN Meds: sodium chloride flush, sodium chloride, ondansetron **OR** ondansetron, polyethylene glycol, acetaminophen **OR** acetaminophen, aluminum & magnesium hydroxide-simethicone, calcium carbonate, ketorolac      Intake/Output Summary (Last 24 hours) at 8/11/2021 1735  Last data filed at 8/11/2021 1700  Gross per 24 hour   Intake 2044. 4 ml   Output 1250 ml   Net 794.4 ml       Physical Exam Performed:    BP (!) 157/78   Pulse 88   Temp 98.9 °F (37.2 °C) (Temporal)   Resp 21   Ht 5' 7\" (1.702 m)   Wt 165 lb 9.1 oz (75.1 kg)   SpO2 98%   BMI 25.93 kg/m²     General appearance: No apparent distress, appears stated age and cooperative. HEENT: Pupils equal, round, and reactive to light. Conjunctivae/corneas clear. Neck: Supple, with full range of motion. No jugular venous distention. Trachea midline. Respiratory:  Normal respiratory effort. Clear to auscultation, bilaterally without Rales/Wheezes/Rhonchi. Cardiovascular: Regular rate and rhythm with normal S1/S2 without murmurs, rubs or gallops. Abdomen: Soft, non-tender, non-distended with normal bowel sounds. Musculoskeletal: No clubbing, cyanosis or edema bilaterally. Full range of motion without deformity. Skin: Skin color, texture, turgor normal.  No rashes or lesions. Neurologic:  Neurovascularly intact without any focal sensory/motor deficits. Cranial nerves: II-XII intact, grossly non-focal.  Psychiatric: Alert and oriented, thought content appropriate, normal insight  Capillary Refill: Brisk,< 3 seconds   Peripheral Pulses: +2 palpable, equal bilaterally       Labs:   Recent Labs     08/10/21  1808 08/11/21  0121 08/11/21  0649   WBC 18.5*  --  37.5*   HGB 12.1* 13.1* 11.1*   HCT 36.5*  --  34.4*     --  322     Recent Labs     08/10/21  1808 08/11/21  0649   * 139   K 3.7 3.7   CL 98* 105   CO2 22 23   BUN 7 4*   CREATININE 0.7* 0.6*   CALCIUM 8.6 8.5     Recent Labs     08/10/21  1808 08/11/21  0649   AST 17 12*   ALT 10 7*   BILITOT 0.3 0.5   ALKPHOS 99 93     No results for input(s): INR in the last 72 hours. No results for input(s): Narendra Yair in the last 72 hours. Urinalysis:      Lab Results   Component Value Date    NITRU Negative 08/10/2021    WBCUA 16 08/10/2021    BACTERIA 2+ 08/10/2021    RBCUA 2 08/10/2021    BLOODU SMALL 08/10/2021    SPECGRAV 1.007 08/10/2021    GLUCOSEU Negative 08/10/2021       Radiology:  CT ABDOMEN PELVIS W IV CONTRAST Additional Contrast? None   Final Result   Mild dilation the proximal jejunum. This is nonspecific focal ileus versus   enteritis.       No urolithiasis or obstructive uropathy. Cardiomegaly. Mild enlargement of the prostate gland. US DUP ABD PEL RETRO SCROT COMPLETE   Final Result   No intratesticular masses. Findings consistent with right epididymo-orchitis. Small bilateral hydroceles. US SCROTUM AND TESTICLES   Final Result   No intratesticular masses. Findings consistent with right epididymo-orchitis. Small bilateral hydroceles. Assessment/Plan:    Active Hospital Problems    Diagnosis     Pain in right testicle [N50.811]     Septicemia ([facility] [address] [A41.9]     Cocaine abuse ([facility] [address] [F14.10]     Screen for STD (sexually transmitted disease) [Z11.3]     Screening for HIV (human immunodeficiency virus) [Z11.4]     Need for hepatitis B screening test [Z11.59]     Encounter for hepatitis C virus screening test for high risk patient [Z11.59, U74.56]     Complicated UTI (urinary tract infection) [N39.0]     Need for diphtheria-tetanus-pertussis (Tdap) vaccine [R05]     Metabolic encephalopathy [X32.37]     Epididymo-orchitis [N45.3]      Sepsis  POA  Secondary to epididymoorchitis  WBC worsening in spite of Rocephin Levaquin  Cultures pending  Neurology on board  Infectious disease consulted, antibiotics escalated to Zosyn and Zyvox    Acute metabolic encephalopathy  Improved  Drug screen positive for cocaine  Sepsis could also be the culprit    DVT Prophylaxis: Lovenox  Diet: ADULT DIET;  Regular  Code Status: Full Code    Electronically signed by Manohar Tang MD on 8/11/2021 at 5:35 PM

## 2021-08-11 NOTE — PROGRESS NOTES
Assessment complete. VSS. Patient responsive to voice. Extremely lethargic. PO medications held. NSR per monitor. Lung sounds clear. Skin warm, dry and intact. Peripheral IV unremarkable and infusing LR at 125 mL/hr. Denies any pain. Scrotum is slightly enlarged. Repositioned for comfort. Bed linens changed. Bed alarm engaged. Call light within reach.

## 2021-08-11 NOTE — ED NOTES
Melo La gave this RN report. Pt appears very lethargic and comatose and only responds to voice. Pt remains snoring away. Hospitalist at bedside.       Unique Agrawal RN  08/10/21 7123

## 2021-08-11 NOTE — H&P
[facility] History and Physical    8/10/2021    Date of Admission: 8/10/2021    Date of Service: Pt seen/examined on 8/10/2021 and admitted to inpatient-ICU for close monitoring due to lethargy. Assessment/plan:  1. Right epididymoorchitis. GC/Chlamydia study currently pending from the emergency room. Patient received a dose of Rocephin and Levaquin in the emergency room; will continue both. Continue intravenous fluid. As needed oxycodone and IV morphine for pain. 2. Sepsis secondary to #1. Sepsis criteria includes leukocytosis, fever, tachycardia, altered mental status. Continue intravenous fluid, antibiotics. 3. Acute metabolic encephalopathy. Patient appears confused at the time of evaluation, although girlfriend states that this is normal for him \"when he is sleeping, he sleeps like that. \"  Checking blood gas, urine drug screen. Will admit to ICU for close monitoring. Activities: Up with assist  Prophylaxis: Subcutaneous Lovenox  Code status: Full code    ==========================================================  Chief complaint:  Chief Complaint   Patient presents with    Testicle Pain     Pt with c/o swelling in right testicle x3 days. States pain is radiating up into his abdomen. History of Presenting Illness: This is a pleasant 60 y.o. male with no significant past medical history, who denies sexual intercourse with multiple partners, who presents to the emergency room with complaints of right testicular swelling, pain, ongoing for the past 3 days. He was noted to have fever as high as 102 °F in the emergency room. Scrotal ultrasound is consistent with right epididymo-orchitis. Patient is noted to be confused, lethargic at the time of evaluation. Much of HPI obtained from review of electronic medical records as patient is unable to provide relevant HPI due to lethargy. Patient is being admitted for further management. Past Medical History:  History reviewed.  No pertinent past medical history. Past Surgical History:  History reviewed. No pertinent surgical history. Medications (prior to admission):  Prior to Admission medications    Medication Sig Start Date End Date Taking? Authorizing Provider   Lansoprazole (PREVACID PO) Take by mouth   Yes Historical Provider, MD       Allergy(ies):  Patient has no known allergies. Social History:  TOBACCO:  reports that he has been smoking cigarettes. He has never used smokeless tobacco.  ETOH:  reports current alcohol use. Family History:      Family history unknown: Yes       Review of Systems:  Patient is lethargic, unable to provide relevant HPI. Vitals and physical examination:  /74   Pulse 102   Temp 99.4 °F (37.4 °C) (Oral)   Resp 15   Ht 5' 7\" (1.702 m)   Wt 168 lb (76.2 kg)   SpO2 96%   BMI 26.31 kg/m²   Gen/overall appearance: Not in acute distress. Lethargic, confused. Head: Normocephalic, atraumatic  Eyes: EOMI, good acuity  ENT: Oral mucosa moist  Neck: No JVD, thyromegaly  CVS: Nml S1S2, no MRG, RRR  Pulm: Clear bilaterally. No crackles/wheezes  Gastrointestinal: Soft, NT/ND, +BS  Musculoskeletal: No edema. Warm  : Right scrotal swelling. Neuro: No focal deficit. Moves extremity spontaneously. Psychiatry: Appropriate affect. Not agitated. Skin: Warm, dry with normal turgor. No rash  Capillary refill: Brisk,< 3 seconds   Peripheral Pulses: +2 palpable, equal bilaterally       Labs/imaging/EKG:  CBC:   Recent Labs     08/10/21  1808   WBC 18.5*   HGB 12.1*        BMP:    Recent Labs     08/10/21  1808   *   K 3.7   CL 98*   CO2 22   BUN 7   CREATININE 0.7*   GLUCOSE 107*     Hepatic:   Recent Labs     08/10/21  1808   AST 17   ALT 10   BILITOT 0.3   ALKPHOS 99       US SCROTUM AND TESTICLES    Result Date: 8/10/2021  EXAMINATION: DOPPLER EVALUATION OF THE PELVIS; ULTRASOUND OF THE SCROTUM/TESTICLES WITH COLOR DOPPLER FLOW EVALUATION 8/10/2021 COMPARISON: None.  HISTORY: ORDERING SYSTEM PROVIDED HISTORY: R testicle pain and fever x2-3 days TECHNOLOGIST PROVIDED HISTORY: Reason for exam:->R testicle pain and fever x2-3 days; ORDERING SYSTEM PROVIDED HISTORY: R testicle pain and fever TECHNOLOGIST PROVIDED HISTORY: Reason for exam:->R testicle pain and fever FINDINGS: Measurements: Right testicle: 4.6 x 3.1 x 3.5 cm Left testicle: 4.1 x 2.9 x 3.1 cm Right: (Identified is more anterior) Grey scale:  No mass is identified. Doppler Evaluation:  Doppler flow is increased. Scrotal Sac: There is a small hydrocele. Epididymis: The epididymis is swollen with moderate hypervascularity. Left: (Identified is more posterior) Grey scale: The left testicle demonstrates normal homogeneous echotexture without focal lesion. No evidence of testicular microlithiasis. Doppler Evaluation:  There is normal arterial and venous Doppler flow within the testicle. Scrotal Sac: There is a small hydrocele. Epididymis:  No acute abnormality. No intratesticular masses. Findings consistent with right epididymo-orchitis. Small bilateral hydroceles. CT ABDOMEN PELVIS W IV CONTRAST Additional Contrast? None    Result Date: 8/10/2021  EXAMINATION: CT OF THE ABDOMEN AND PELVIS WITH CONTRAST 8/10/2021 7:56 pm TECHNIQUE: CT of the abdomen and pelvis was performed with the administration of intravenous contrast. Multiplanar reformatted images are provided for review. Dose modulation, iterative reconstruction, and/or weight based adjustment of the mA/kV was utilized to reduce the radiation dose to as low as reasonably achievable. COMPARISON: None.  HISTORY: ORDERING SYSTEM PROVIDED HISTORY: fever, R groin/testicle pain TECHNOLOGIST PROVIDED HISTORY: Additional Contrast?->None Reason for exam:->fever, R groin/testicle pain Decision Support Exception - unselect if not a suspected or confirmed emergency medical condition->Emergency Medical Condition (MA) Reason for Exam: fever, R groin/testicle pain Acuity: Acute Type of Exam: Initial FINDINGS: Lower Chest: Air trapping is noted in the lung bases. There is mild cardiomegaly. Organs: The liver is homogeneous and normal in attenuation. No gallbladder stones are seen. The pancreas, spleen and adrenal glands are unremarkable. The nephrograms are symmetrical.  There is no hydronephrosis or obstructing calculus. Cyst-like hypodensities in the kidneys measure up to 7 mm. No suspicious lesions are seen. GI/Bowel: The stomach is unremarkable. There is mild dilation of jejunal loops, without focal area of transition. There is mild diverticulosis of the descending and sigmoid colon. No pericolonic edema is seen. Pelvis: Urinary bladder is unremarkable. Prostate gland is mildly enlarged. Peritoneum/Retroperitoneum: There is no aneurysm, adenopathy or free fluid. Bones/Soft Tissues: At L5-S1 there is grade 1 spondylolisthesis with bilateral L5 pars defects. There is disproportionate spondylosis at this level. No acute fractures are identified. Abdominal wall is unremarkable. Mild dilation the proximal jejunum. This is nonspecific focal ileus versus enteritis. No urolithiasis or obstructive uropathy. Cardiomegaly. Mild enlargement of the prostate gland. US DUP ABD PEL RETRO SCROT COMPLETE    Result Date: 8/10/2021  EXAMINATION: DOPPLER EVALUATION OF THE PELVIS; ULTRASOUND OF THE SCROTUM/TESTICLES WITH COLOR DOPPLER FLOW EVALUATION 8/10/2021 COMPARISON: None. HISTORY: ORDERING SYSTEM PROVIDED HISTORY: R testicle pain and fever x2-3 days TECHNOLOGIST PROVIDED HISTORY: Reason for exam:->R testicle pain and fever x2-3 days; ORDERING SYSTEM PROVIDED HISTORY: R testicle pain and fever TECHNOLOGIST PROVIDED HISTORY: Reason for exam:->R testicle pain and fever FINDINGS: Measurements: Right testicle: 4.6 x 3.1 x 3.5 cm Left testicle: 4.1 x 2.9 x 3.1 cm Right: (Identified is more anterior) Grey scale:  No mass is identified. Doppler Evaluation:  Doppler flow is increased. Scrotal Sac: There is a small hydrocele. Epididymis: The epididymis is swollen with moderate hypervascularity. Left: (Identified is more posterior) Grey scale: The left testicle demonstrates normal homogeneous echotexture without focal lesion. No evidence of testicular microlithiasis. Doppler Evaluation:  There is normal arterial and venous Doppler flow within the testicle. Scrotal Sac: There is a small hydrocele. Epididymis:  No acute abnormality. No intratesticular masses. Findings consistent with right epididymo-orchitis. Small bilateral hydroceles. Discussed with ER provider.       Thank you Nanette Hahn for the opportunity to be involved in this patient's care.    -----------------------------  Asael Alexander MD  [facility]

## 2021-08-11 NOTE — ED NOTES
This RN writing attempted to do ABG and unsuccessful. Pt unable to sit still.       Cassidy Shields, BUFFY  08/10/21 [id number]

## 2021-08-11 NOTE — ED NOTES
Report given to Halley Jo RN. No further questions at this time.       Unique Agrawal RN  08/10/21 [id number]

## 2021-08-11 NOTE — ED NOTES
Pt. o2 sating below 90% on RA while sleep.   O2- applied via cannula at 2L to maintain sats     Francisco Marin RN  08/10/21 [id number]

## 2021-08-11 NOTE — FLOWSHEET NOTE
08/11/21 1600   Provider Notification   Reason for Communication Review case   Provider Name [facility]   Provider Notification Physician   Method of Communication Secure Message   Response Waiting for response   Notification Time [id number]     \"FYI patient more alert at this time. stating he had stepped on a nail within the last week or so. . c/o pain. just got tetanus booster this afternoon.  not swollen/red/irritated\"

## 2021-08-11 NOTE — CONSULTS
[facility] Pulmonary and Critical Care   Consult Note      Reason for Consult: Severe sepsis  Requesting Physician: Abi Orosco    Subjective:   CHIEF COMPLAINT: Altered mental status and right testicular pain     HPI: Patient is somnolent, wakes up to painful stimulation. States that his right testis hurts for the last 1 week. High-grade fever of 102 noted in the ER. Denies any shortness of breath, cough or chest pain. Patient transferred to ICU setting due to concerns for altered mental status. Urine tox screen was positive for cocaine. Critical care consultation requested. Currently no other information available. The patient is a 60 y.o. male with significant past medical history of:  History reviewed. No pertinent past medical history. Past Surgical History:    History reviewed. No pertinent surgical history.   Current Medications:    Current Facility-Administered Medications: pantoprazole (PROTONIX) tablet 40 mg, 40 mg, Oral, QAM AC  lactated ringers infusion, , Intravenous, Continuous  sodium chloride flush 0.9 % injection 5-40 mL, 5-40 mL, Intravenous, 2 times per day  sodium chloride flush 0.9 % injection 10 mL, 10 mL, Intravenous, PRN  0.9 % sodium chloride infusion, 25 mL, Intravenous, PRN  enoxaparin (LOVENOX) injection 40 mg, 40 mg, Subcutaneous, Daily  ondansetron (ZOFRAN-ODT) disintegrating tablet 4 mg, 4 mg, Oral, Q8H PRN **OR** ondansetron (ZOFRAN) injection 4 mg, 4 mg, Intravenous, Q6H PRN  polyethylene glycol (GLYCOLAX) packet 17 g, 17 g, Oral, Daily PRN  acetaminophen (TYLENOL) tablet 650 mg, 650 mg, Oral, Q6H PRN **OR** acetaminophen (TYLENOL) suppository 650 mg, 650 mg, Rectal, Q6H PRN  piperacillin-tazobactam (ZOSYN) 3,375 mg in dextrose 5 % 50 mL IVPB extended infusion (mini-bag), 3,375 mg, Intravenous, Q8H  linezolid (ZYVOX) IVPB 600 mg, 600 mg, Intravenous, Q12H  Tetanus-Diphth-Acell Pertussis (BOOSTRIX) injection 0.5 mL, 0.5 mL, Intramuscular, Once  lansoprazole (PREVACID) delayed release capsule 30 mg, 30 mg, Oral, QAM AC  aluminum & magnesium hydroxide-simethicone (MAALOX) 200-200-20 MG/5ML suspension 30 mL, 30 mL, Oral, Q6H PRN  ketorolac (TORADOL) injection 30 mg, 30 mg, Intravenous, Q6H PRN    No Known Allergies    Social History:    TOBACCO:   reports that he has been smoking cigarettes. He has never used smokeless tobacco.  ETOH:   reports current alcohol use. Patient currently lives independently    Family History:       Family history unknown: Yes       REVIEW OF SYSTEMS:    Unable to obtain since the patient is very somnolent. Objective:     Patient Vitals for the past 8 hrs:   BP Temp Temp src Pulse Resp SpO2   08/11/21 0939 -- -- -- -- 22 92 %   08/11/21 0800 (!) 154/71 98 °F (36.7 °C) Temporal 69 15 98 %   08/11/21 0615 -- -- -- 79 17 94 %   08/11/21 0600 (!) 147/64 -- -- 75 17 94 %   08/11/21 0545 (!) 149/68 -- -- 71 17 98 %   08/11/21 0530 (!) 152/71 -- -- 68 19 98 %   08/11/21 0515 (!) 153/75 -- -- 73 13 (!) 85 %   08/11/21 0500 (!) 148/64 -- -- 69 16 (!) 89 %   08/11/21 0445 (!) 128/59 -- -- 71 17 97 %     I/O last 3 completed shifts: In: 424.2 [I.V.:330; IV Piggyback:94.2]  Out: 550 [Urine:550]  No intake/output data recorded.     Physical Exam:  General Appearance: Somnolent, in no acute distress  Skin: warm and dry, no rash or erythema  Head: normocephalic and atraumatic  Eyes: pupils equal, round, and reactive to light, extraocular eye movements intact, conjunctivae normal  ENT: external ear and ear canal normal bilaterally, nose without deformity, nasal mucosa and turbinates normal  Neck: supple and non-tender without mass, no cervical lymphadenopathy  Pulmonary/Chest: clear to auscultation bilaterally- no wheezes, rales or rhonchi, normal air movement, no respiratory distress  Cardiovascular: normal rate, regular rhythm,  no murmurs, rubs, distal pulses intact, no carotid bruits  Abdomen: soft, non-tender, non-distended, normal bowel sounds, no masses or organomegaly  Lymph Nodes: Cervical, supraclavicular normal  Extremities: no cyanosis, clubbing or edema  Musculoskeletal: normal range of motion, no joint swelling, deformity or tenderness  Neurologic: Somnolent, no focal neurologic deficits    Data Review:  CBC:   Lab Results   Component Value Date    WBC 37.5 08/11/2021    RBC 4.12 08/11/2021     BMP:   Lab Results   Component Value Date    GLUCOSE 89 08/11/2021    CO2 23 08/11/2021    BUN 4 08/11/2021    CREATININE 0.6 08/11/2021    CALCIUM 8.5 08/11/2021     ABG: No results found for: XNV1CGS, BEART, V1JHVTNS, PHART, THGBART, HTF6LZH, PO2ART, APM0ANH    Radiology: All pertinent images / reports were reviewed as a part of this visit.     Narrative   EXAMINATION:   DOPPLER EVALUATION OF THE PELVIS; ULTRASOUND OF THE SCROTUM/TESTICLES WITH   COLOR DOPPLER FLOW EVALUATION       8/10/2021       COMPARISON:   None.       HISTORY:   ORDERING SYSTEM PROVIDED HISTORY: R testicle pain and fever x2-3 days   TECHNOLOGIST PROVIDED HISTORY:   Reason for exam:->R testicle pain and fever x2-3 days; ORDERING SYSTEM   PROVIDED HISTORY: R testicle pain and fever   TECHNOLOGIST PROVIDED HISTORY:       Reason for exam:->R testicle pain and fever       FINDINGS:       Measurements:       Right testicle: 4.6 x 3.1 x 3.5 cm       Left testicle: 4.1 x 2.9 x 3.1 cm           Right:       (Identified is more anterior)       Grey scale:  No mass is identified.       Doppler Evaluation:  Doppler flow is increased.       Scrotal Sac:  There is a small hydrocele.       Epididymis:  The epididymis is swollen with moderate hypervascularity.           Left:       (Identified is more posterior)       Grey scale:  The left testicle demonstrates normal homogeneous echotexture   without focal lesion.  No evidence of testicular microlithiasis.       Doppler Evaluation:  There is normal arterial and venous Doppler flow within   the testicle.       Scrotal Sac: [place] is a small hydrocele.     Epididymis:  No acute abnormality.           Impression   No intratesticular masses.       Findings consistent with right epididymo-orchitis.       Small bilateral hydroceles. Problem List:   Acute epididymal orchitis  Leukocytosis  Cocaine abuse/altered mental status    Assessment/Plan:     Patient has acute epididymoorchitis based on testicular ultrasound. Mild bilateral hydrocele noted. WBC 18.5> 37.5. Fever spike in ER. Currently on empiric Zosyn and Zyvox as per ID. Urology consultation will also be obtained. Lactate 1.2. Check for gonorrhea/chlamydia urine screen. Altered mental status possibly related to cocaine abuse. Consider CT imaging if no improvement in mental status noted in the next 24 hours. Continue with IV fluid resuscitation. Critical care team will follow for 1 day.     Maria L Kc MD

## 2021-08-12 ENCOUNTER — APPOINTMENT (OUTPATIENT)
Dept: GENERAL RADIOLOGY | Age: 56
DRG: 720 | End: 2021-08-12
Payer: MEDICAID

## 2021-08-12 ENCOUNTER — APPOINTMENT (OUTPATIENT)
Dept: CT IMAGING | Age: 56
DRG: 720 | End: 2021-08-12
Payer: MEDICAID

## 2021-08-12 LAB
A/G RATIO: 0.7 (ref 1.1–2.2)
ALBUMIN SERPL-MCNC: 2.8 G/DL (ref 3.4–5)
ALP BLD-CCNC: 87 U/L (ref 40–129)
ALT SERPL-CCNC: 8 U/L (ref 10–40)
ANION GAP SERPL CALCULATED.3IONS-SCNC: 10 MMOL/L (ref 3–16)
AST SERPL-CCNC: 13 U/L (ref 15–37)
BASOPHILS ABSOLUTE: 0 K/UL (ref 0–0.2)
BASOPHILS RELATIVE PERCENT: 0.1 %
BILIRUB SERPL-MCNC: <0.2 MG/DL (ref 0–1)
BUN BLDV-MCNC: 7 MG/DL (ref 7–20)
CALCIUM SERPL-MCNC: 8.5 MG/DL (ref 8.3–10.6)
CHLORIDE BLD-SCNC: 104 MMOL/L (ref 99–110)
CO2: 24 MMOL/L (ref 21–32)
CREAT SERPL-MCNC: 0.6 MG/DL (ref 0.9–1.3)
EOSINOPHILS ABSOLUTE: 0.2 K/UL (ref 0–0.6)
EOSINOPHILS RELATIVE PERCENT: 1 %
GFR AFRICAN AMERICAN: >60
GFR NON-AFRICAN AMERICAN: >60
GLOBULIN: 3.9 G/DL
GLUCOSE BLD-MCNC: 116 MG/DL (ref 70–99)
GLUCOSE BLD-MCNC: 164 MG/DL (ref 70–99)
HCT VFR BLD CALC: 33.2 % (ref 40.5–52.5)
HEMOGLOBIN: 10.9 G/DL (ref 13.5–17.5)
HIV AG/AB: NORMAL
HIV ANTIGEN: NORMAL
HIV-1 ANTIBODY: NORMAL
HIV-2 AB: NORMAL
LYMPHOCYTES ABSOLUTE: 1.7 K/UL (ref 1–5.1)
LYMPHOCYTES RELATIVE PERCENT: 9.9 %
MCH RBC QN AUTO: 27.7 PG (ref 26–34)
MCHC RBC AUTO-ENTMCNC: 32.9 G/DL (ref 31–36)
MCV RBC AUTO: 84.2 FL (ref 80–100)
MONOCYTES ABSOLUTE: 0.9 K/UL (ref 0–1.3)
MONOCYTES RELATIVE PERCENT: 5.5 %
NEUTROPHILS ABSOLUTE: 14.2 K/UL (ref 1.7–7.7)
NEUTROPHILS RELATIVE PERCENT: 83.5 %
ORGANISM: ABNORMAL
PDW BLD-RTO: 19.2 % (ref 12.4–15.4)
PERFORMED ON: ABNORMAL
PLATELET # BLD: 272 K/UL (ref 135–450)
PMV BLD AUTO: 8 FL (ref 5–10.5)
POTASSIUM REFLEX MAGNESIUM: 3.7 MMOL/L (ref 3.5–5.1)
RBC # BLD: 3.95 M/UL (ref 4.2–5.9)
REPORT: NORMAL
SODIUM BLD-SCNC: 138 MMOL/L (ref 136–145)
TOTAL PROTEIN: 6.7 G/DL (ref 6.4–8.2)
TOTAL SYPHILLIS IGG/IGM: NORMAL
URINE CULTURE, ROUTINE: ABNORMAL
WBC # BLD: 17 K/UL (ref 4–11)

## 2021-08-12 PROCEDURE — 85025 COMPLETE CBC W/AUTO DIFF WBC: CPT

## 2021-08-12 PROCEDURE — 94760 N-INVAS EAR/PLS OXIMETRY 1: CPT

## 2021-08-12 PROCEDURE — 6370000000 HC RX 637 (ALT 250 FOR IP): Performed by: INTERNAL MEDICINE

## 2021-08-12 PROCEDURE — 99233 SBSQ HOSP IP/OBS HIGH 50: CPT | Performed by: INTERNAL MEDICINE

## 2021-08-12 PROCEDURE — 1200000000 HC SEMI PRIVATE

## 2021-08-12 PROCEDURE — 80053 COMPREHEN METABOLIC PANEL: CPT

## 2021-08-12 PROCEDURE — 87040 BLOOD CULTURE FOR BACTERIA: CPT

## 2021-08-12 PROCEDURE — 71250 CT THORAX DX C-: CPT

## 2021-08-12 PROCEDURE — 2580000003 HC RX 258: Performed by: INTERNAL MEDICINE

## 2021-08-12 PROCEDURE — 6360000002 HC RX W HCPCS: Performed by: INTERNAL MEDICINE

## 2021-08-12 PROCEDURE — 99232 SBSQ HOSP IP/OBS MODERATE 35: CPT | Performed by: INTERNAL MEDICINE

## 2021-08-12 PROCEDURE — 36415 COLL VENOUS BLD VENIPUNCTURE: CPT

## 2021-08-12 PROCEDURE — 73610 X-RAY EXAM OF ANKLE: CPT

## 2021-08-12 RX ADMIN — ACETAMINOPHEN 650 MG: 325 TABLET ORAL at 15:58

## 2021-08-12 RX ADMIN — Medication 10 ML: at 21:01

## 2021-08-12 RX ADMIN — ANTACID TABLETS 500 MG: 500 TABLET, CHEWABLE ORAL at 13:28

## 2021-08-12 RX ADMIN — PIPERACILLIN AND TAZOBACTAM 3375 MG: 3; .375 INJECTION, POWDER, LYOPHILIZED, FOR SOLUTION INTRAVENOUS at 20:59

## 2021-08-12 RX ADMIN — LINEZOLID 600 MG: 600 INJECTION, SOLUTION INTRAVENOUS at 01:37

## 2021-08-12 RX ADMIN — KETOROLAC TROMETHAMINE 30 MG: 30 INJECTION, SOLUTION INTRAMUSCULAR; INTRAVENOUS at 22:57

## 2021-08-12 RX ADMIN — ONDANSETRON 4 MG: 4 TABLET, ORALLY DISINTEGRATING ORAL at 13:28

## 2021-08-12 RX ADMIN — SODIUM CHLORIDE, POTASSIUM CHLORIDE, SODIUM LACTATE AND CALCIUM CHLORIDE: 600; 310; 30; 20 INJECTION, SOLUTION INTRAVENOUS at 01:30

## 2021-08-12 RX ADMIN — PIPERACILLIN AND TAZOBACTAM 3375 MG: 3; .375 INJECTION, POWDER, LYOPHILIZED, FOR SOLUTION INTRAVENOUS at 04:01

## 2021-08-12 RX ADMIN — KETOROLAC TROMETHAMINE 30 MG: 30 INJECTION, SOLUTION INTRAMUSCULAR; INTRAVENOUS at 15:58

## 2021-08-12 RX ADMIN — PANTOPRAZOLE SODIUM 40 MG: 40 TABLET, DELAYED RELEASE ORAL at 05:36

## 2021-08-12 RX ADMIN — KETOROLAC TROMETHAMINE 30 MG: 30 INJECTION, SOLUTION INTRAMUSCULAR; INTRAVENOUS at 03:57

## 2021-08-12 RX ADMIN — PIPERACILLIN AND TAZOBACTAM 3375 MG: 3; .375 INJECTION, POWDER, LYOPHILIZED, FOR SOLUTION INTRAVENOUS at 12:38

## 2021-08-12 RX ADMIN — ACETAMINOPHEN 650 MG: 325 TABLET ORAL at 21:10

## 2021-08-12 RX ADMIN — SODIUM CHLORIDE 25 ML: 9 INJECTION, SOLUTION INTRAVENOUS at 01:34

## 2021-08-12 ASSESSMENT — PAIN DESCRIPTION - ORIENTATION
ORIENTATION: RIGHT

## 2021-08-12 ASSESSMENT — PAIN DESCRIPTION - DESCRIPTORS
DESCRIPTORS: ACHING
DESCRIPTORS: ACHING;DISCOMFORT

## 2021-08-12 ASSESSMENT — PAIN DESCRIPTION - DIRECTION: RADIATING_TOWARDS: BLADDER

## 2021-08-12 ASSESSMENT — PAIN DESCRIPTION - LOCATION
LOCATION: SCROTUM;ABDOMEN
LOCATION: SCROTUM
LOCATION: GENERALIZED

## 2021-08-12 ASSESSMENT — PAIN DESCRIPTION - PROGRESSION
CLINICAL_PROGRESSION: NOT CHANGED

## 2021-08-12 ASSESSMENT — PAIN SCALES - GENERAL
PAINLEVEL_OUTOF10: 3
PAINLEVEL_OUTOF10: 0
PAINLEVEL_OUTOF10: 8
PAINLEVEL_OUTOF10: 0
PAINLEVEL_OUTOF10: 5
PAINLEVEL_OUTOF10: 3
PAINLEVEL_OUTOF10: 9
PAINLEVEL_OUTOF10: 8
PAINLEVEL_OUTOF10: 8
PAINLEVEL_OUTOF10: 0
PAINLEVEL_OUTOF10: 7

## 2021-08-12 ASSESSMENT — ENCOUNTER SYMPTOMS
EYE REDNESS: 0
SHORTNESS OF BREATH: 0
BACK PAIN: 0
RHINORRHEA: 0
CONSTIPATION: 0
COUGH: 0
WHEEZING: 0
TROUBLE SWALLOWING: 0
EYE DISCHARGE: 0
DIARRHEA: 0
SORE THROAT: 0
ABDOMINAL PAIN: 0
NAUSEA: 0

## 2021-08-12 ASSESSMENT — PAIN DESCRIPTION - ONSET
ONSET: GRADUAL
ONSET: ON-GOING

## 2021-08-12 ASSESSMENT — PAIN - FUNCTIONAL ASSESSMENT
PAIN_FUNCTIONAL_ASSESSMENT: ACTIVITIES ARE NOT PREVENTED
PAIN_FUNCTIONAL_ASSESSMENT: ACTIVITIES ARE NOT PREVENTED

## 2021-08-12 ASSESSMENT — PAIN DESCRIPTION - FREQUENCY
FREQUENCY: INTERMITTENT
FREQUENCY: CONTINUOUS

## 2021-08-12 ASSESSMENT — PAIN DESCRIPTION - PAIN TYPE
TYPE: ACUTE PAIN

## 2021-08-12 ASSESSMENT — PAIN SCALES - WONG BAKER
WONGBAKER_NUMERICALRESPONSE: 2
WONGBAKER_NUMERICALRESPONSE: 2
WONGBAKER_NUMERICALRESPONSE: 0

## 2021-08-12 NOTE — PROGRESS NOTES
Hospitalist Progress Note      PCP: Jim Sauceda    Date of Admission: 8/10/2021    Chief Complaint: Testicular pain    Hospital Course: 60 y.o. male with no significant past medical history, who denies sexual intercourse with multiple partners, who presents to the emergency room with complaints of right testicular swelling, pain, ongoing for the past 3 days. He was noted to have fever as high as 102 °F in the emergency room. Scrotal ultrasound is consistent with right epididymo-orchitis. Patient is noted to be confused, lethargic at the time of evaluation. Much of HPI obtained from review of electronic medical records as patient is unable to provide relevant HPI due to lethargy. Patient is being admitted for further management. Subjective: Patient seen and examined at bedside. Patient appears to be at his baseline. Testicular pain improved.     Medications:  Reviewed    Infusion Medications    sodium chloride Stopped (08/12/21 0401)     Scheduled Medications    pantoprazole  40 mg Oral QAM AC    sodium chloride flush  5-40 mL Intravenous 2 times per day    enoxaparin  40 mg Subcutaneous Daily    piperacillin-tazobactam  3,375 mg Intravenous Q8H    nicotine  1 patch Transdermal Daily    covid-19 vaccine  1 Dose Intramuscular Prior to discharge     PRN Meds: sodium chloride flush, sodium chloride, ondansetron **OR** ondansetron, polyethylene glycol, acetaminophen **OR** acetaminophen, aluminum & magnesium hydroxide-simethicone, calcium carbonate, ketorolac      Intake/Output Summary (Last 24 hours) at 8/12/2021 1426  Last data filed at 8/12/2021 1400  Gross per 24 hour   Intake 4286.82 ml   Output 1900 ml   Net 2386.82 ml       Physical Exam Performed:    BP (!) 168/99   Pulse 65   Temp 98.1 °F (36.7 °C) (Temporal)   Resp 29   Ht 5' 7\" (1.702 m)   Wt 167 lb 5.3 oz (75.9 kg)   SpO2 94%   BMI 26.21 kg/m²     General appearance: No apparent distress, appears stated age and cooperative. HEENT: Pupils equal, round, and reactive to light. Conjunctivae/corneas clear. Neck: Supple, with full range of motion. No jugular venous distention. Trachea midline. Respiratory:  Normal respiratory effort. Clear to auscultation, bilaterally without Rales/Wheezes/Rhonchi. Cardiovascular: Regular rate and rhythm with normal S1/S2 without murmurs, rubs or gallops. Abdomen: Soft, non-tender, non-distended with normal bowel sounds. Musculoskeletal: No clubbing, cyanosis or edema bilaterally. Full range of motion without deformity. Skin: Skin color, texture, turgor normal.  No rashes or lesions. Neurologic:  Neurovascularly intact without any focal sensory/motor deficits. Cranial nerves: II-XII intact, grossly non-focal.  Psychiatric: Alert and oriented, thought content appropriate, normal insight  Capillary Refill: Brisk,< 3 seconds   Peripheral Pulses: +2 palpable, equal bilaterally       Labs:   Recent Labs     08/10/21  1808 08/10/21  1808 08/11/21  0121 08/11/21  0649 08/12/21  0450   WBC 18.5*  --   --  37.5* 17.0*   HGB 12.1*   < > 13.1* 11.1* 10.9*   HCT 36.5*  --   --  34.4* 33.2*     --   --  322 272    < > = values in this interval not displayed. Recent Labs     08/10/21  1808 08/11/21  0649 08/12/21  0450   * 139 138   K 3.7 3.7 3.7   CL 98* 105 104   CO2 22 23 24   BUN 7 4* 7   CREATININE 0.7* 0.6* 0.6*   CALCIUM 8.6 8.5 8.5     Recent Labs     08/10/21  1808 08/11/21  0649 08/12/21  0450   AST 17 12* 13*   ALT 10 7* 8*   BILITOT 0.3 0.5 <0.2   ALKPHOS 99 93 87     No results for input(s): INR in the last 72 hours. No results for input(s): Allan Hug in the last 72 hours.     Urinalysis:      Lab Results   Component Value Date    NITRU Negative 08/10/2021    WBCUA 16 08/10/2021    BACTERIA 2+ 08/10/2021    RBCUA 2 08/10/2021    BLOODU SMALL 08/10/2021    SPECGRAV 1.007 08/10/2021    GLUCOSEU Negative 08/10/2021       Radiology:  CT CHEST WO CONTRAST Preliminary Result   1. Findings most consistent with mild interstitial edema with interlobular   septal thickening, trace bilateral effusions and mild dependent ground-glass   opacification in the upper lobes. No focal infiltrate, mass or significant   airways disease. 2. Mild cardiomegaly. 3. Mild infiltration of the subcutaneous fat consistent with mild   subcutaneous edema. CT ABDOMEN PELVIS W IV CONTRAST Additional Contrast? None   Final Result   Mild dilation the proximal jejunum. This is nonspecific focal ileus versus   enteritis. No urolithiasis or obstructive uropathy. Cardiomegaly. Mild enlargement of the prostate gland. US DUP ABD PEL RETRO SCROT COMPLETE   Final Result   No intratesticular masses. Findings consistent with right epididymo-orchitis. Small bilateral hydroceles. US SCROTUM AND TESTICLES   Final Result   No intratesticular masses. Findings consistent with right epididymo-orchitis. Small bilateral hydroceles.          XR ANKLE RIGHT (MIN 3 VIEWS)    (Results Pending)           Assessment/Plan:    Active Hospital Problems    Diagnosis     Elevated sed rate [R70.0]     Elevated C-reactive protein (CRP) [R79.82]     Pain in right testicle [N50.811]     Septicemia ([facility] [address] [A41.9]     Cocaine abuse ([facility] [address] [F14.10]     Screen for STD (sexually transmitted disease) [Z11.3]     Screening for HIV (human immunodeficiency virus) [Z11.4]     Need for hepatitis B screening test [Z11.59]     Encounter for hepatitis C virus screening test for high risk patient [Z11.59, Z91.89]     E. coli UTI (urinary tract infection) [N39.0, B96.20]     Need for diphtheria-tetanus-pertussis (Tdap) vaccine [N93]     Metabolic encephalopathy [H81.89]     Epididymo-orchitis [N45.3]      Sepsis-improving  POA  Secondary to epididymoorchitis  WBC worsening in spite of Rocephin Levaquin  Cultures 1 bottle staph epi in the blood  Urine positive for E. coli, sensitivity pending  Urology on board  Infectious disease consulted, antibiotics escalated to Zosyn and Zyvox    Acute metabolic encephalopathy  Improved  Drug screen positive for cocaine  Sepsis could also be the culprit    Pain and swelling of the right ball of the food  Patient reports stepping on a nail couple of weeks ago  Did not seek medical care  Podiatry consulted for further investigation    Okay to transfer patient out of critical care unit to [facility]. DVT Prophylaxis: Lovenox  Diet: ADULT DIET;  Regular  Code Status: Full Code    Electronically signed by Pop Ballard MD on 8/12/2021 at 2:26 PM

## 2021-08-12 NOTE — PROGRESS NOTES
Reassessment and VS completed. See flowsheet. Pt still c/o pain in right side of scrotum and abdomen. Rates pain 7/10, however states he feels like pain is improving since admission. Toradol given per MD order. Will reassess. Afebrile all shift. Girlfriend still at bedside. Denies needs. Call light in reach.

## 2021-08-12 NOTE — PROGRESS NOTES
Infectious Diseases   Progress Note      Admission Date: 8/10/2021  Hospital Day: Hospital Day: 3   Attending: Roxana Pratt MD  Date of service: 8/12/2021     Chief complaint/ Reason for consult:     · Sepsis with high fever, worsening bandemia, tachypnea, metabolic encephalopathy  · Right epididymoorchitis  · Complicated urinary tract infection  · Cocaine abuse    Microbiology:        I have reviewed allavailable micro lab data and cultures    · Blood culture (2/2) - collected on 8/10/2021: Staph epidermidis    · Urine culture  - collected on 8/10/2021: Greater 100,000 CFU per mL of E. coli UTI    Susceptibility    Escherichia coli (1)    Antibiotic Interpretation MONSTER Status    ampicillin Resistant >=32 mcg/mL     ceFAZolin Sensitive <=4 mcg/mL      NOTE: Cefazolin should only be used for uncomplicated UTI         for E.coli or Klebsiella pneumoniae. cefepime Sensitive <=0.12 mcg/mL     cefTRIAXone Sensitive <=0.25 mcg/mL     ciprofloxacin Sensitive 0.5 mcg/mL     ertapenem Sensitive <=0.12 mcg/mL     gentamicin Sensitive <=1 mcg/mL     levofloxacin Sensitive 1 mcg/mL     nitrofurantoin Sensitive <=16 mcg/mL     piperacillin-tazobactam Sensitive <=4 mcg/mL     trimethoprim-sulfamethoxazole Resistant >=320 mcg/mL     Lab and Collection      Antibiotics and immunizations:       Current antibiotics: All antibiotics and their doses were reviewed by me    Recent Abx Admin                   piperacillin-tazobactam (ZOSYN) 3,375 mg in dextrose 5 % 50 mL IVPB extended infusion (mini-bag) (mg) 3,375 mg New Bag 08/12/21 0401     3,375 mg New Bag 08/11/21 2122     3,375 mg New Bag  1511    linezolid (ZYVOX) IVPB 600 mg (mg) 600 mg New Bag 08/12/21 0137     600 mg New Bag 08/11/21 1600                  Immunization History: All immunization history was reviewed by me today. Immunization History   Administered Date(s) Administered    Tdap (Boostrix, Adacel) 08/11/2021       Known drug allergies:      All allergies were reviewed and updated    No Known Allergies    Social history:     Social History:  All social andepidemiologic history was reviewed and updated by me today as needed. · Tobacco use:   reports that he has been smoking cigarettes. He has never used smokeless tobacco.  · Alcohol use:   reports current alcohol use. · Currently lives in: [address] [place] [address] Road  ·  reports no history of drug use. COVID VACCINATION AND LAB RESULT RECORDS:     Internal Administration   First Dose      Second Dose           Last COVID Lab No results found for: SARS-COV-2, SARS-COV-2 RNA, SARS-COV-2, SARS-COV-2, SARS-COV-2 BY PCR, SARS-COV-2, SARS-COV-2, SARS-COV-2         Assessment:     The patient is a 60 y.o. old male who  has no past medical history on file. with following problems:    · Sepsis with high fever, worsening bandemia, tachypnea, metabolic encephalopathy-slowly improving  · Right epididymoorchitis-ongoing  · Complicated urinary tract infection-urine culture growing E. coli  · Cocaine abuse  · Elevated sed rate of 98  · Elevated CRP of 210.1. · Need for HIV screen  · STD screen-urine gonorrhea and chlamydia screen is negative  · Need for hepatitis B and C screen  · Overweight due to excess calorie intake : Body mass index is 25.93 kg/m². Discussion:      I have started patient on empiric IV linezolid and IV Zosyn. He seems to be tolerating antibiotics okay. His white cell count has shown improvement and is 17,000 today. 1 set of blood cultures growing staph epidermidis, which is likely contaminant from the skin. Urine culture is growing greater 100,000 CFU per mL of E. coli. It is resistant to Bactrim and ampicillin and sensitive to all other antibiotic classes    He is not immune to hepatitis B      Plan:     Diagnostic Workup:    · Will order 1 set of repeat blood culture today for thoroughness  · Continue to follow  fever curve, WBC count and blood cultures.   · Continue to monitor blood counts, cultures, imaging, development and implementation of treatment plan and coordination of complex care). More than 50 percent of this includes face-to-face time spent with the patient for counseling and coordination of care. Thank you for involving me in the care of your patient. I will continue to follow. If you have anyadditional questions, please do not hesitate to contact me. Subjective: Interval history: Interval history was obtained from chart review and patient/ RN. The patient is more awake and alert today. He is tolerating antibiotics okay. Feels tired     REVIEW OF SYSTEMS:     Review of Systems   Constitutional: Positive for fatigue. Negative for chills, diaphoresis and fever. HENT: Negative for ear discharge, ear pain, rhinorrhea, sore throat and trouble swallowing. Eyes: Negative for discharge and redness. Respiratory: Negative for cough, shortness of breath and wheezing. Cardiovascular: Negative for chest pain and leg swelling. Gastrointestinal: Negative for abdominal pain, constipation, diarrhea and nausea. Endocrine: Negative for polyuria. Genitourinary: Positive for scrotal swelling and testicular pain. Negative for dysuria, flank pain, frequency, hematuria and urgency. Musculoskeletal: Negative for back pain and myalgias. Skin: Negative for rash. Neurological: Negative for dizziness, seizures and headaches. Hematological: Does not bruise/bleed easily. Psychiatric/Behavioral: Negative for hallucinations and suicidal ideas. All other systems reviewed and are negative. Past Medical History: All past medical history reviewed today. History reviewed. No pertinent past medical history. Past Surgical History: All past surgical history was reviewed today. History reviewed. No pertinent surgical history. Family History: All family history was reviewed today.         Family history unknown: Yes       Objective:       PHYSICAL EXAM:      Vitals: Vitals:    08/12/21 0300 08/12/21 0400 08/12/21 0600 08/12/21 0800   BP:  (!) 148/54 (!) 151/92    Pulse:  62 74    Resp: 14 18 25    Temp:  97.4 °F (36.3 °C)  97.2 °F (36.2 °C)   TempSrc:  Temporal  Temporal   SpO2: 92% 91% 98%    Weight:  167 lb 5.3 oz (75.9 kg)     Height:           Physical Exam  Vitals and nursing note reviewed. Constitutional:       Appearance: Normal appearance. He is well-developed. HENT:      Head: Normocephalic and atraumatic. Right Ear: External ear normal.      Left Ear: External ear normal.      Nose: Nose normal. No congestion or rhinorrhea. Mouth/Throat:      Mouth: Mucous membranes are moist.      Pharynx: No oropharyngeal exudate or posterior oropharyngeal erythema. Eyes:      General: No scleral icterus. Right eye: No discharge. Left eye: No discharge. Conjunctiva/sclera: Conjunctivae normal.      Pupils: Pupils are equal, round, and reactive to light. Cardiovascular:      Rate and Rhythm: Normal rate and regular rhythm. Pulses: Normal pulses. Heart sounds: No murmur heard. No friction rub. Pulmonary:      Effort: Pulmonary effort is normal. No respiratory distress. Breath sounds: Normal breath sounds. No stridor. No wheezing, rhonchi or rales. Abdominal:      General: Bowel sounds are normal.      Palpations: Abdomen is soft. Tenderness: There is no abdominal tenderness. There is no right CVA tenderness, left CVA tenderness, guarding or rebound. Genitourinary:     Comments: Tenderness and swelling noted in the right hemiscrotum area  Musculoskeletal:         General: No swelling or tenderness. Normal range of motion. Cervical back: Normal range of motion and neck supple. No rigidity. No muscular tenderness. Lymphadenopathy:      Cervical: No cervical adenopathy. Skin:     General: Skin is warm and dry. Coloration: Skin is not jaundiced. Findings: No erythema or rash.       Comments: Patient gives a history of a recent puncture wound in the right foot. Foot examined. No wound noted on the plantar side of the right foot   Neurological:      General: No focal deficit present. Mental Status: He is alert and oriented to person, place, and time. Mental status is at baseline. Motor: No abnormal muscle tone. Psychiatric:         Mood and Affect: Mood normal.         Behavior: Behavior normal.         Thought Content: Thought content normal.            Lines and drains: All vascular access sites are healthy with no local erythema, discharge or tenderness. Intake and output:    I/O last 3 completed shifts: In: 3336.5 [P.O.:600; I.V.:2636.5; IV Piggyback:100]  Out: 1900 [Urine:1900]    Lab Data:   All available labs and old records have been reviewed by me. CBC:  Recent Labs     08/10/21  1808 08/10/21  1808 08/11/21  0121 08/11/21  0649 08/12/21  0450   WBC 18.5*  --   --  37.5* 17.0*   RBC 4.40  --   --  4.12* 3.95*   HGB 12.1*   < > 13.1* 11.1* 10.9*   HCT 36.5*  --   --  34.4* 33.2*     --   --  322 272   MCV 83.0  --   --  83.5 84.2   MCH 27.4  --   --  27.0 27.7   MCHC 33.0  --   --  32.3 32.9   RDW 19.5*  --   --  18.8* 19.2*    < > = values in this interval not displayed. BMP:  Recent Labs     08/10/21  1808 08/11/21  0649 08/12/21  0450   * 139 138   K 3.7 3.7 3.7   CL 98* 105 104   CO2 22 23 24   BUN 7 4* 7   CREATININE 0.7* 0.6* 0.6*   CALCIUM 8.6 8.5 8.5   GLUCOSE 107* 89 116*        Hepatic Function Panel:   Lab Results   Component Value Date    ALKPHOS 87 08/12/2021    ALT 8 08/12/2021    AST 13 08/12/2021    PROT 6.7 08/12/2021    BILITOT <0.2 08/12/2021    LABALBU 2.8 08/12/2021       CPK: No results found for: CKTOTAL  ESR:   Lab Results   Component Value Date    SEDRATE 98 (H) 08/11/2021     CRP:   Lab Results   Component Value Date    .1 (H) 08/11/2021           Imaging:     All pertinent images and reports for the current visit were reviewed by me during this visit. CT ABDOMEN PELVIS W IV CONTRAST Additional Contrast? None   Final Result   Mild dilation the proximal jejunum. This is nonspecific focal ileus versus   enteritis. No urolithiasis or obstructive uropathy. Cardiomegaly. Mild enlargement of the prostate gland. US DUP ABD PEL RETRO SCROT COMPLETE   Final Result   No intratesticular masses. Findings consistent with right epididymo-orchitis. Small bilateral hydroceles. US SCROTUM AND TESTICLES   Final Result   No intratesticular masses. Findings consistent with right epididymo-orchitis. Small bilateral hydroceles. XR ANKLE RIGHT (MIN 3 VIEWS)    (Results Pending)   CT CHEST WO CONTRAST    (Results Pending)       Medications: All current and past medications were reviewed.      pantoprazole  40 mg Oral QAM AC    sodium chloride flush  5-40 mL Intravenous 2 times per day    enoxaparin  40 mg Subcutaneous Daily    piperacillin-tazobactam  3,375 mg Intravenous Q8H    linezolid  600 mg Intravenous Q12H    nicotine  1 patch Transdermal Daily    covid-19 vaccine  1 Dose Intramuscular Prior to discharge        sodium chloride Stopped (08/12/21 0401)       sodium chloride flush, sodium chloride, ondansetron **OR** ondansetron, polyethylene glycol, acetaminophen **OR** acetaminophen, aluminum & magnesium hydroxide-simethicone, calcium carbonate, ketorolac      Problem list:       Patient Active Problem List   Diagnosis Code    Epididymo-orchitis N45.3    Pain in right testicle N50.811    Septicemia ([facility] [address] A41.9    Cocaine abuse ([facility] [address] F14.10    Screen for STD (sexually transmitted disease) Z11.3    Screening for HIV (human immunodeficiency virus) Z11.4    Need for hepatitis B screening test Z11.59    Encounter for hepatitis C virus screening test for high risk patient Z11.59, Z91.89    E. coli UTI (urinary tract infection) N39.0, B96.20    Need for diphtheria-tetanus-pertussis (Tdap) vaccine B00    Metabolic encephalopathy P74.64    Elevated sed rate R70.0    Elevated C-reactive protein (CRP) R79.82       Please note that this chart was generated using Dragon dictation software. Although every effort was made to ensure the accuracy of this automated transcription, some errors in transcription may have occurred inadvertently. If you may need any clarification, please do not hesitate to contact me through EPIC or at the phone number provided below with my electronic signature. Any pictures or media included in this note were obtained after taking informed verbal consent from the patient and with their approval to include those in the patient's medical record.     Saul Pretty MD, MPH  8/12/2021 , 10:18 AM   [facility]   [address] [place] [address], [address] [place] [address]  Office: [phone]  Fax: [phone]  Clinic days:  Tuesday & Thursday

## 2021-08-12 NOTE — PROGRESS NOTES
Sent message to Dr. Bella Baldwin regarding pt's urine cultures growing E.coli and positive blood cultures with Staph Epidermidis.

## 2021-08-12 NOTE — PROGRESS NOTES
Assessment and VS complete. See flowsheet. Pt A&O. Daughter at bedside. Pt c/o pain at IV site. Leaking when flushed- IV removed. New IV placed in right wrist. Pt c/o pain in abdomen and scrotum 7/10. Toradol given per MD order. Will reassess. Pulled up and repositioned for comfort. Slight swelling noted to scrotum. Pt states he stepped on a nail 3 days ago, right foot, and he has had pain radiating up his leg towards his scrotum the past couple of days. There is a small, healed scab on the bottom of right foot. Skin dry and flaky. Afebrile. POC discussed. Pt denies further needs, except did bring pt a turkey sandwich and fresh ice water. Call light in reach.

## 2021-08-12 NOTE — PROGRESS NOTES
Pt asking about Covid vaccines. Is interested in receiving vaccine here before discharge, however does not want the [employer] brand.

## 2021-08-12 NOTE — PROGRESS NOTES
Assumed care of pt @13:00. Due to staffing needs assinments had to be switched/consolidated. Upon assessment pt found to be resting in chair with no complaints of pain, but severe nausea/heartburn. Prn meds given and effective. Will follow pt for all needed care until 19:00.

## 2021-08-12 NOTE — PROGRESS NOTES
0800: Shift assessment complete, VSS, see flowsheets. AM meds refused by pt. Pt up to bathroom, tolerated well. Bed in lowest position, call light in reach.

## 2021-08-12 NOTE — CONSULTS
Urology Consult Note  [facility]     Patient: Ravinder Noonan MRN: [id number]  Room/Bed: [facility] (formerly [facility])5872/6053-41   YOB: 1965  Age/Sex: 60 y.o.male  Admission Date: 8/10/2021     Date of Service:  8/12/2021    Consulting Provider: CARINE Armstrong CNP  Admitting/Requesting Physician: Asael Alexander MD  Primary Care Physician: Nanette Hahn    Reason for Consult: Epididymo-Orchitis     ASSESSMENT/PLAN     Epididymo-Orchitis   Leukocytosis 37.5 on admission now 16  FAUSTO with no abscess or tara's  E.coli uti/bacteremia  Mentioned he has had [address] off and on for last year  -Smoking history    Recommendations:  -Continue abx, appreciate ID recommendations  -He will need to f/u to evaluate resolution of infection and for a gross hematuria workup. All patient questions were answered. He understands the plan as listed above. HISTORY     Chief Complaint:   Chief Complaint   Patient presents with    Testicle Pain     Pt with c/o swelling in right testicle x3 days. States pain is radiating up into his abdomen. History of Present Illness: Ravinder Noonan is a 60 y.o. male with Epididymo-Orchitis . Onset of symptoms was days ago with rapidly improving course since that time. Symptoms are aggravated by UTI. Symptoms improved with abx. Associated symptoms include scrotal pain. Patient also reports chills. He has tried the following treatments: abx. Past Medical History:  He has no past medical history on file.      Hospital Problem List:  Active Problems:    Epididymo-orchitis    Pain in right testicle    Septicemia ([facility] [address]    Cocaine abuse ([facility] [address]    Screen for STD (sexually transmitted disease)    Screening for HIV (human immunodeficiency virus)    Need for hepatitis B screening test    Encounter for hepatitis C virus screening test for high risk patient    Complicated UTI (urinary tract infection)    Need for diphtheria-tetanus-pertussis (Tdap) vaccine    Metabolic abdominal pain encephalopathy  Resolved Problems:    * No resolved hospital problems. *      Past Surgical History:  He has no past surgical history on file. Social History:  He reports that he has been smoking cigarettes. He has never used smokeless tobacco. He reports current alcohol use. He reports that he does not use drugs. Family History:  Family history is unknown by patient. Allergies:  No Known Allergies    Medications:  Scheduled Meds:   pantoprazole  40 mg Oral QAM AC    sodium chloride flush  5-40 mL Intravenous 2 times per day    enoxaparin  40 mg Subcutaneous Daily    piperacillin-tazobactam  3,375 mg Intravenous Q8H    linezolid  600 mg Intravenous Q12H    nicotine  1 patch Transdermal Daily    covid-19 vaccine  1 Dose Intramuscular Prior to discharge     Continuous Infusions:   sodium chloride Stopped (08/12/21 0401)     PRN Meds:sodium chloride flush, sodium chloride, ondansetron **OR** ondansetron, polyethylene glycol, acetaminophen **OR** acetaminophen, aluminum & magnesium hydroxide-simethicone, calcium carbonate, ketorolac    Review of Systems:  Pertinent positives/negatives reviewed in HPI. All other systems reviewed and negative, unless noted below. Constitutional: Negative  Genitourinary: see HPI  HEENT: Negative   Cardiovascular: Negative   Respiratory: Negative   Gastrointestinal: Negative   Musculoskeletal: Negative   Neurological: Negative   Psychiatric: Negative   Integumentary: Negative     PHYSICAL EXAM     Vitals:    08/12/21 0800   BP:    Pulse:    Resp:    Temp: 97.2 °F (36.2 °C)   SpO2:      CONSTITUTIONAL: The patient is well nourished/developed, with no distress noted. NEUROLOGICAL/PSYCHIATRIC: Oriented to place and time, normal affected noted. NECK: The neck is symmetrical and supple, with no masses noted. CARDIOVASCULAR: Regular rate and rhythm, no evidence of swelling noted. RESPIRATORY: Normal respiratory effort with no wheezing noted.    ABDOMEN: Abdomen soft, non-tender, non-distended. No enlarged liver or spleen. No hernias noted. Stool occult blood not indicated. SKIN: Skin appears normal.  LYMPHATICS: No adenopathy noted. CVA: No CVA tenderness bilaterally. GENITOURINARY: The penis is without rash or lesions and meatus with expected size and location. The scrotum appears normal. Bilateral testicles appears to be of normal size and location. No masses or tenderness noted of testicles or epididymis. NATY: Deferred. [Sphincter with good tone. Prostate is of normal size, smooth and with benign consistency. The seminal vesicles are not palpable.]    Ins/Outs:    Intake/Output Summary (Last 24 hours) at 8/12/2021 1001  Last data filed at 8/12/2021 0402  Gross per 24 hour   Intake 3096.48 ml   Output 1900 ml   Net 1196.48 ml       LABS     CBC   Lab Results   Component Value Date    WBC 17.0 08/12/2021    RBC 3.95 08/12/2021    HGB 10.9 08/12/2021    HCT 33.2 08/12/2021    MCV 84.2 08/12/2021    MCH 27.7 08/12/2021    MCHC 32.9 08/12/2021    RDW 19.2 08/12/2021     08/12/2021    MPV 8.0 08/12/2021     BMP   Lab Results   Component Value Date     08/12/2021    K 3.7 08/12/2021     08/12/2021    CO2 24 08/12/2021    BUN 7 08/12/2021    CREATININE 0.6 08/12/2021    GLUCOSE 116 08/12/2021    CALCIUM 8.5 08/12/2021     Urinalysis:   Lab Results   Component Value Date    COLORU YELLOW 08/10/2021    GLUCOSEU Negative 08/10/2021    BLOODU SMALL 08/10/2021    NITRU Negative 08/10/2021    LEUKOCYTESUR MODERATE 08/10/2021     Urine culture:   Recent Labs     08/10/21  1934   LABURIN >100,000 CFU/ml     PSA: No results found for: PSA      IMAGING     US SCROTUM AND TESTICLES    Result Date: 8/10/2021  EXAMINATION: DOPPLER EVALUATION OF THE PELVIS; ULTRASOUND OF THE SCROTUM/TESTICLES WITH COLOR DOPPLER FLOW EVALUATION 8/10/2021 COMPARISON: None.  HISTORY: ORDERING SYSTEM PROVIDED HISTORY: R testicle pain and fever x2-3 days TECHNOLOGIST PROVIDED HISTORY: Reason for exam:->R testicle pain and fever x2-3 days; ORDERING SYSTEM PROVIDED HISTORY: R testicle pain and fever TECHNOLOGIST PROVIDED HISTORY: Reason for exam:->R testicle pain and fever FINDINGS: Measurements: Right testicle: 4.6 x 3.1 x 3.5 cm Left testicle: 4.1 x 2.9 x 3.1 cm Right: (Identified is more anterior) Grey scale:  No mass is identified. Doppler Evaluation:  Doppler flow is increased. Scrotal Sac: There is a small hydrocele. Epididymis: The epididymis is swollen with moderate hypervascularity. Left: (Identified is more posterior) Grey scale: The left testicle demonstrates normal homogeneous echotexture without focal lesion. No evidence of testicular microlithiasis. Doppler Evaluation:  There is normal arterial and venous Doppler flow within the testicle. Scrotal Sac: There is a small hydrocele. Epididymis:  No acute abnormality. No intratesticular masses. Findings consistent with right epididymo-orchitis. Small bilateral hydroceles. CT ABDOMEN PELVIS W IV CONTRAST Additional Contrast? None    Result Date: 8/10/2021  EXAMINATION: CT OF THE ABDOMEN AND PELVIS WITH CONTRAST 8/10/2021 7:56 pm TECHNIQUE: CT of the abdomen and pelvis was performed with the administration of intravenous contrast. Multiplanar reformatted images are provided for review. Dose modulation, iterative reconstruction, and/or weight based adjustment of the mA/kV was utilized to reduce the radiation dose to as low as reasonably achievable. COMPARISON: None. HISTORY: ORDERING SYSTEM PROVIDED HISTORY: fever, R groin/testicle pain TECHNOLOGIST PROVIDED HISTORY: Additional Contrast?->None Reason for exam:->fever, R groin/testicle pain Decision Support Exception - unselect if not a suspected or confirmed emergency medical condition->Emergency Medical Condition (MA) Reason for Exam: fever, R groin/testicle pain Acuity: Acute Type of Exam: Initial FINDINGS: Lower Chest: Air trapping is noted in the lung bases.   There is mild cardiomegaly. Organs: The liver is homogeneous and normal in attenuation. No gallbladder stones are seen. The pancreas, spleen and adrenal glands are unremarkable. The nephrograms are symmetrical.  There is no hydronephrosis or obstructing calculus. Cyst-like hypodensities in the kidneys measure up to 7 mm. No suspicious lesions are seen. GI/Bowel: The stomach is unremarkable. There is mild dilation of jejunal loops, without focal area of transition. There is mild diverticulosis of the descending and sigmoid colon. No pericolonic edema is seen. Pelvis: Urinary bladder is unremarkable. Prostate gland is mildly enlarged. Peritoneum/Retroperitoneum: There is no aneurysm, adenopathy or free fluid. Bones/Soft Tissues: At L5-S1 there is grade 1 spondylolisthesis with bilateral L5 pars defects. There is disproportionate spondylosis at this level. No acute fractures are identified. Abdominal wall is unremarkable. Mild dilation the proximal jejunum. This is nonspecific focal ileus versus enteritis. No urolithiasis or obstructive uropathy. Cardiomegaly. Mild enlargement of the prostate gland. US DUP ABD PEL RETRO SCROT COMPLETE    Result Date: 8/10/2021  EXAMINATION: DOPPLER EVALUATION OF THE PELVIS; ULTRASOUND OF THE SCROTUM/TESTICLES WITH COLOR DOPPLER FLOW EVALUATION 8/10/2021 COMPARISON: None. HISTORY: ORDERING SYSTEM PROVIDED HISTORY: R testicle pain and fever x2-3 days TECHNOLOGIST PROVIDED HISTORY: Reason for exam:->R testicle pain and fever x2-3 days; ORDERING SYSTEM PROVIDED HISTORY: R testicle pain and fever TECHNOLOGIST PROVIDED HISTORY: Reason for exam:->R testicle pain and fever FINDINGS: Measurements: Right testicle: 4.6 x 3.1 x 3.5 cm Left testicle: 4.1 x 2.9 x 3.1 cm Right: (Identified is more anterior) Grey scale:  No mass is identified. Doppler Evaluation:  Doppler flow is increased. Scrotal Sac: There is a small hydrocele. Epididymis:   The epididymis is swollen with moderate hypervascularity. Left: (Identified is more posterior) Grey scale: The left testicle demonstrates normal homogeneous echotexture without focal lesion. No evidence of testicular microlithiasis. Doppler Evaluation:  There is normal arterial and venous Doppler flow within the testicle. Scrotal Sac: There is a small hydrocele. Epididymis:  No acute abnormality. No intratesticular masses. Findings consistent with right epididymo-orchitis. Small bilateral hydroceles.             Electronically signed by: CARINE Simon CNP  8/12/2021   [facility]  Office Contact: [phone]

## 2021-08-12 NOTE — PROGRESS NOTES
[facility] Pulmonary/CCM Progress note      Admit Date: 8/10/2021    Chief Complaint: Altered mental status and right testicular pain    Subjective: Interval History: Alert and oriented today, complains of right ankle pain. Also gives recent history of epistaxis/hemoptysis, currently no shortness of breath, cough or hemoptysis.     Scheduled Meds:   pantoprazole  40 mg Oral QAM AC    sodium chloride flush  5-40 mL Intravenous 2 times per day    enoxaparin  40 mg Subcutaneous Daily    piperacillin-tazobactam  3,375 mg Intravenous Q8H    nicotine  1 patch Transdermal Daily    covid-19 vaccine  1 Dose Intramuscular Prior to discharge     Continuous Infusions:   sodium chloride Stopped (08/12/21 0401)     PRN Meds:sodium chloride flush, sodium chloride, ondansetron **OR** ondansetron, polyethylene glycol, acetaminophen **OR** acetaminophen, aluminum & magnesium hydroxide-simethicone, calcium carbonate, ketorolac    Review of Systems  Constitutional: negative for fatigue, fevers, malaise and weight loss  Ears, nose, mouth, throat: negative for ear drainage, epistaxis, hoarseness, nasal congestion, sore throat and voice change  Respiratory: negative for shortness of breath, cough, phlegm or pleurisy  Cardiovascular: negative for chest pain, chest pressure/discomfort, irregular heart beat, lower extremity edema and palpitations  Gastrointestinal: negative for abdominal pain, constipation, diarrhea, jaundice, melena, odynophagia, reflux symptoms and vomiting  Hematologic/lymphatic: negative for bleeding, easy bruising, lymphadenopathy and petechiae  Musculoskeletal:negative for arthralgias, bone pain, muscle weakness, neck pain and stiff joints  Neurological: negative for dizziness, gait problems, headaches, seizures, speech problems, tremors and weakness  Behavioral/Psych: negative for anxiety, behavior problems, depression, fatigue and sleep disturbance  Endocrine: negative for diabetic symptoms including none, neuropathy, polyphagia, polyuria, polydipsia, vomiting and diarrhea and temperature intolerance  Allergic/Immunologic: negative for anaphylaxis, angioedema, hay fever and urticaria    Objective:     Patient Vitals for the past 8 hrs:   BP Temp Temp src Pulse Resp SpO2   08/12/21 1400 (!) 168/99 98.1 °F (36.7 °C) Temporal 65 -- 94 %   08/12/21 1000 (!) 169/76 -- -- 80 29 99 %   08/12/21 0800 -- 97.2 °F (36.2 °C) Temporal -- -- --     I/O last 3 completed shifts: In: 4286.8 [P.O.:120; I.V.:3400.1; IV Piggyback:766.7]  Out: 1900 [Urine:1900]  No intake/output data recorded.     General Appearance: alert and oriented to person, place and time, well developed and well- nourished, in no acute distress  Skin: warm and dry, no rash or erythema  Head: normocephalic and atraumatic  Eyes: pupils equal, round, and reactive to light, extraocular eye movements intact, conjunctivae normal  ENT: external ear and ear canal normal bilaterally, nose without deformity, nasal mucosa and turbinates normal  Neck: supple and non-tender without mass, no cervical lymphadenopathy  Pulmonary/Chest: clear to auscultation bilaterally- no wheezes, rales or rhonchi, normal air movement, no respiratory distress  Cardiovascular: normal rate, regular rhythm,  no murmurs, rubs, distal pulses intact, no carotid bruits  Abdomen: soft, non-tender, non-distended, normal bowel sounds, no masses or organomegaly  Lymph Nodes: Cervical, supraclavicular normal  Extremities: no cyanosis, clubbing or edema  Musculoskeletal: normal range of motion, no joint swelling, deformity or tenderness  Neurologic: alert, no focal neurologic deficits    Data Review:  CBC:   Lab Results   Component Value Date    WBC 17.0 08/12/2021    RBC 3.95 08/12/2021     BMP:   Lab Results   Component Value Date    GLUCOSE 116 08/12/2021    CO2 24 08/12/2021    BUN 7 08/12/2021    CREATININE 0.6 08/12/2021    CALCIUM 8.5 08/12/2021     ABG:   Lab Results   Component Value Date    XPY1JCY 24.0 08/11/2021    BEART -1 08/11/2021    I6SEDAIF 96 08/11/2021    PHART 7.402 08/11/2021    FBL1MKR 38.5 08/11/2021    PO2ART 82.9 08/11/2021    NIS6JNY 25 08/11/2021       Radiology: All pertinent images / reports were reviewed as a part of this visit    . Narrative   EXAMINATION:   CT OF THE CHEST WITHOUT CONTRAST 8/12/2021 10:34 am       TECHNIQUE:   CT of the chest was performed without the administration of intravenous   contrast. Multiplanar reformatted images are provided for review. Dose   modulation, iterative reconstruction, and/or weight based adjustment of the   mA/kV was utilized to reduce the radiation dose to as low as reasonably   achievable.       COMPARISON:   None.       HISTORY:   ORDERING SYSTEM PROVIDED HISTORY: assess for reason for hemoptysis. TECHNOLOGIST PROVIDED HISTORY:   Reason for exam:->assess for reason for hemoptysis. Reason for Exam: assess for reason for hemoptysis. Acuity: Acute   Type of Exam: Initial       FINDINGS:   Mediastinum: The thoracic aorta is normal in course and caliber.  Mild   cardiomegaly with no pericardial effusion.  The main pulmonary artery is   normal in caliber.  No pathologic mediastinal adenopathy.  Calcified   mediastinal and right hilar nodes consistent with remote granulomatous   disease.  The thyroid gland and esophagus are unremarkable as visualized.       Lungs/pleura: There are new trace bilateral pleural effusions compared to CT   of the abdomen 08/10/2021 with mild dependent atelectasis.  Mild interlobular   septal thickening suggesting mild interstitial edema.  There are nonspecific   areas of dependent ground-glass opacification in the upper lobes bilaterally,   right greater than left, most likely mild interstitial edema.  There is mild   centrilobular emphysematous changes with an upper lobe predominance.  The   central airways are patent.       Upper Abdomen:  The visualized upper abdominal viscera are unremarkable.       Soft Tissues/Bones: Mild infiltration of the subcutaneous fat throughout the   chest.  No focal subcutaneous collection.  No acute osseous findings with   mild multilevel osteoarthritic spurring in the thoracic spine.           Impression   1. Findings most consistent with mild interstitial edema with interlobular   septal thickening, trace bilateral effusions and mild dependent ground-glass   opacification in the upper lobes.  No focal infiltrate, mass or significant   airways disease. 2. Mild cardiomegaly. 3. Mild infiltration of the subcutaneous fat consistent with mild   subcutaneous edema. Problem List:     Right epididymoorchitis  E. coli UTI  Cocaine abuse  Assessment/Plan:     Patient had CT chest performed today on account of history of hemoptysis-evidence of mild pulmonary edema, no abnormal lung nodules or opacities. Give 1 dose of IV Lasix, 12-lead EKG and 2D echo will be performed. E. coli UTI, currently on Zosyn. Patient also has right epididymoorchitis as noted on recent testicular ultrasound. History of cocaine abuse-snorts cocaine occasionally as per report. Urine tox screen was positive for cocaine which could explain altered mental status upon admission. Now awake and alert. Critical care team will sign off.     Tino Flores MD

## 2021-08-12 NOTE — PROGRESS NOTES
Pt compalining of pain to scrotum. PRN meds given and effective. Pt educated on the the diease process of sepsis and UTI. Pt educated on abx and healthy eating habits to improve general health. Pt requesting to peak to dietitian to help from healthy earthing habits upon DC.

## 2021-08-13 VITALS
WEIGHT: 168.65 LBS | DIASTOLIC BLOOD PRESSURE: 75 MMHG | RESPIRATION RATE: 25 BRPM | TEMPERATURE: 98.4 F | OXYGEN SATURATION: 94 % | HEIGHT: 67 IN | BODY MASS INDEX: 26.47 KG/M2 | SYSTOLIC BLOOD PRESSURE: 159 MMHG | HEART RATE: 69 BPM

## 2021-08-13 LAB
A/G RATIO: 0.7 (ref 1.1–2.2)
ALBUMIN SERPL-MCNC: 3 G/DL (ref 3.4–5)
ALP BLD-CCNC: 97 U/L (ref 40–129)
ALT SERPL-CCNC: 18 U/L (ref 10–40)
ANION GAP SERPL CALCULATED.3IONS-SCNC: 10 MMOL/L (ref 3–16)
AST SERPL-CCNC: 28 U/L (ref 15–37)
BASOPHILS ABSOLUTE: 0.1 K/UL (ref 0–0.2)
BASOPHILS RELATIVE PERCENT: 0.7 %
BILIRUB SERPL-MCNC: <0.2 MG/DL (ref 0–1)
BUN BLDV-MCNC: 11 MG/DL (ref 7–20)
CALCIUM SERPL-MCNC: 8.7 MG/DL (ref 8.3–10.6)
CHLORIDE BLD-SCNC: 104 MMOL/L (ref 99–110)
CO2: 25 MMOL/L (ref 21–32)
CREAT SERPL-MCNC: 0.7 MG/DL (ref 0.9–1.3)
EOSINOPHILS ABSOLUTE: 0.2 K/UL (ref 0–0.6)
EOSINOPHILS RELATIVE PERCENT: 2.6 %
GFR AFRICAN AMERICAN: >60
GFR NON-AFRICAN AMERICAN: >60
GLOBULIN: 4.1 G/DL
GLUCOSE BLD-MCNC: 105 MG/DL (ref 70–99)
HCT VFR BLD CALC: 34.2 % (ref 40.5–52.5)
HEMOGLOBIN: 11.2 G/DL (ref 13.5–17.5)
LV EF: 58 %
LVEF MODALITY: NORMAL
LYMPHOCYTES ABSOLUTE: 1.6 K/UL (ref 1–5.1)
LYMPHOCYTES RELATIVE PERCENT: 17.3 %
MCH RBC QN AUTO: 27.4 PG (ref 26–34)
MCHC RBC AUTO-ENTMCNC: 32.6 G/DL (ref 31–36)
MCV RBC AUTO: 84.3 FL (ref 80–100)
MONOCYTES ABSOLUTE: 0.9 K/UL (ref 0–1.3)
MONOCYTES RELATIVE PERCENT: 9.4 %
NEUTROPHILS ABSOLUTE: 6.4 K/UL (ref 1.7–7.7)
NEUTROPHILS RELATIVE PERCENT: 70 %
PDW BLD-RTO: 19.1 % (ref 12.4–15.4)
PLATELET # BLD: 314 K/UL (ref 135–450)
PMV BLD AUTO: 7.6 FL (ref 5–10.5)
POTASSIUM REFLEX MAGNESIUM: 4.1 MMOL/L (ref 3.5–5.1)
RBC # BLD: 4.06 M/UL (ref 4.2–5.9)
SODIUM BLD-SCNC: 139 MMOL/L (ref 136–145)
TOTAL PROTEIN: 7.1 G/DL (ref 6.4–8.2)
WBC # BLD: 9.1 K/UL (ref 4–11)

## 2021-08-13 PROCEDURE — 97161 PT EVAL LOW COMPLEX 20 MIN: CPT

## 2021-08-13 PROCEDURE — 94761 N-INVAS EAR/PLS OXIMETRY MLT: CPT

## 2021-08-13 PROCEDURE — 2580000003 HC RX 258: Performed by: INTERNAL MEDICINE

## 2021-08-13 PROCEDURE — 6370000000 HC RX 637 (ALT 250 FOR IP): Performed by: INTERNAL MEDICINE

## 2021-08-13 PROCEDURE — 36415 COLL VENOUS BLD VENIPUNCTURE: CPT

## 2021-08-13 PROCEDURE — 85025 COMPLETE CBC W/AUTO DIFF WBC: CPT

## 2021-08-13 PROCEDURE — 99233 SBSQ HOSP IP/OBS HIGH 50: CPT | Performed by: INTERNAL MEDICINE

## 2021-08-13 PROCEDURE — 93306 TTE W/DOPPLER COMPLETE: CPT

## 2021-08-13 PROCEDURE — 80053 COMPREHEN METABOLIC PANEL: CPT

## 2021-08-13 PROCEDURE — 6360000002 HC RX W HCPCS: Performed by: INTERNAL MEDICINE

## 2021-08-13 PROCEDURE — 97165 OT EVAL LOW COMPLEX 30 MIN: CPT

## 2021-08-13 RX ORDER — CIPROFLOXACIN 500 MG/1
500 TABLET, FILM COATED ORAL EVERY 12 HOURS SCHEDULED
Qty: 20 TABLET | Refills: 0 | Status: SHIPPED | OUTPATIENT
Start: 2021-08-13 | End: 2021-08-23

## 2021-08-13 RX ORDER — CIPROFLOXACIN 500 MG/1
500 TABLET, FILM COATED ORAL EVERY 12 HOURS SCHEDULED
Status: DISCONTINUED | OUTPATIENT
Start: 2021-08-13 | End: 2021-08-13 | Stop reason: HOSPADM

## 2021-08-13 RX ORDER — METRONIDAZOLE 500 MG/1
500 TABLET ORAL EVERY 8 HOURS SCHEDULED
Qty: 30 TABLET | Refills: 0 | Status: SHIPPED | OUTPATIENT
Start: 2021-08-13 | End: 2021-08-23

## 2021-08-13 RX ORDER — MICONAZOLE NITRATE 20.6 MG/G
POWDER TOPICAL
Qty: 45 G | Refills: 0 | Status: SHIPPED | OUTPATIENT
Start: 2021-08-13 | End: 2021-08-13 | Stop reason: HOSPADM

## 2021-08-13 RX ORDER — METRONIDAZOLE 250 MG/1
500 TABLET ORAL EVERY 8 HOURS SCHEDULED
Status: DISCONTINUED | OUTPATIENT
Start: 2021-08-13 | End: 2021-08-13 | Stop reason: HOSPADM

## 2021-08-13 RX ADMIN — PANTOPRAZOLE SODIUM 40 MG: 40 TABLET, DELAYED RELEASE ORAL at 05:48

## 2021-08-13 RX ADMIN — Medication 10 ML: at 08:51

## 2021-08-13 RX ADMIN — METRONIDAZOLE 500 MG: 250 TABLET ORAL at 15:21

## 2021-08-13 RX ADMIN — PIPERACILLIN AND TAZOBACTAM 3375 MG: 3; .375 INJECTION, POWDER, LYOPHILIZED, FOR SOLUTION INTRAVENOUS at 04:47

## 2021-08-13 RX ADMIN — ENOXAPARIN SODIUM 40 MG: 40 INJECTION SUBCUTANEOUS at 08:50

## 2021-08-13 ASSESSMENT — PAIN SCALES - GENERAL
PAINLEVEL_OUTOF10: 0

## 2021-08-13 ASSESSMENT — PAIN DESCRIPTION - PROGRESSION
CLINICAL_PROGRESSION: NOT CHANGED

## 2021-08-13 ASSESSMENT — ENCOUNTER SYMPTOMS
CONSTIPATION: 0
NAUSEA: 0
EYE REDNESS: 0
BACK PAIN: 0
DIARRHEA: 0
SHORTNESS OF BREATH: 0
WHEEZING: 0
RHINORRHEA: 0
COUGH: 0
ABDOMINAL PAIN: 0
TROUBLE SWALLOWING: 0
SORE THROAT: 0
EYE DISCHARGE: 0

## 2021-08-13 ASSESSMENT — PAIN SCALES - WONG BAKER
WONGBAKER_NUMERICALRESPONSE: 2

## 2021-08-13 NOTE — PROGRESS NOTES
reviewed and updated by me today as needed. · Tobacco use:   reports that he has been smoking cigarettes. He has never used smokeless tobacco.  · Alcohol use:   reports current alcohol use. · Currently lives in: [facility]  ·  reports no history of drug use. COVID VACCINATION AND LAB RESULT RECORDS:     Internal Administration   First Dose      Second Dose           Last COVID Lab No results found for: SARS-COV-2, SARS-COV-2 RNA, SARS-COV-2, SARS-COV-2, SARS-COV-2 BY PCR, SARS-COV-2, SARS-COV-2, SARS-COV-2         Assessment:     The patient is a 60 y.o. old male who  has no past medical history on file. with following problems:    · Sepsis with high fever, worsening bandemia, tachypnea, metabolic encephalopathy-resolved  · Right epididymoorchitis-improving  · Complicated urinary tract infection-urine culture growing E. Coli-susceptibilities reviewed - covered with PO cipro  · Tinea pedis  · Cocaine abuse-counseling done  · Elevated sed rate of 98  · Elevated CRP of 210.1.-Should improve slowly  · Need for HIV screen  · STD screen-urine gonorrhea and chlamydia screen is negative. Syphilis screen was negative  · Need for hepatitis B and C screen  · Overweight due to excess calorie intake : Body mass index is 25.93 kg/m². Discussion:      The patient is afebrile. He is on IV Zosyn. White cell count is improved and is 9100 today. Staph epidermidis is isolated in 1 set of blood culture is likely contaminant from the skin. Right hemiscrotum pain and swelling is improving. Serum creatinine 0.7. White cell count is 9100. He had a CT scan of the chest without contrast done yesterday. Images reviewed. He had some atelectatic changes with trace bilateral pleural effusions. Syphilis serology has also come back negative    2D echo has been done today with ejection fraction is 55 to 60%.     Plan:     Diagnostic Workup:      · Continue to follow  fever curve, WBC count and blood cultures. · Continue to monitor blood counts, liver and renal function. Antimicrobials:    · Will stop empiric IV Zosyn today  · Will order empiric p.o. Cipro 500 mg every 12 hour  · We will order empiric p.o. Flagyl 500 mg every 8 hours  · Recommend a 10-day course of oral ciprofloxacin and Flagyl at the time of discharge, if he does okay on oral options  · For tinea pedis, recommend topical miconazole ointment 2% to be applied twice daily between all toes. Keep the feet dry at all times  · We will follow up on the culture results and clinical progress and will make further recommendations accordingly. · Continue close vitals monitoring. · Maintain good glycemic control. · Fall precautions. Aspiration precautions. · Continue to watch for new fever or diarrhea. · DVT prophylaxis. · Discussed all above with patient and RN. · Discussed with Dr. Bradford Harding, hospitalist at bedside      Drug Monitoring:    · Continue monitoring for antibiotic toxicity as follows: CBC, CMP, QTc interval  · Continue to watch for following: new or worsening fever, new hypotension, hives, lip swelling and redness or purulence at vascular access sites. I/v access Management:    · Continue to monitor i.v access sites for erythema, induration, discharge or tenderness. · As always, continue efforts to minimize tubes/lines/drains as clinically appropriate to reduce chances of line associated infections. Patient education and counseling:        · The patient was educated in detail about the side-effects of various antibiotics and things to watch for like new rashes, lip swelling, severe reaction, worsening diarrhea, break through fever etc.  · Discussed patient's condition and what to expect. All of the patient's questions were addressed in a satisfactory manner and patient verbalized understanding all instructions. Illicit drug use and tobacco use disorder counselin.  I have counseled the patient extensively about risks of using illicit drugs and have encouraged the patient to maintain a healthy drug-free lifestyle. Information was given about various substance use prevention programs available in the area and their websites including www. addicted. org, www.madd. org, www.catsober. org, www.[employer]. [employer] and www. addictionservicescouncil.org.  2. I have counseled the patient extensively about risks of smoking and have encouraged the patient to maintain a healthy smoke-free lifestyle. Information was given about various smoking cessation programs and their websites like www.smokefree.gov, [place]. quitlogix. org as well as help lines like 1-975AccellionQUIT-NOW and 1-618-LUNG-[place]. Fluoroquinolone related instructions:     Patient instructed to watch for low or high blood sugars, muscle pains and ankle tendon pain while on Ciprofloxacin or Levofloxacin. Patient was advised to keep a sugar candy at all times as all fluoroquinolones have the potential of causing hypoglycemia. If these symptoms develops, patient was instructed to stop the antibiotic and call my office at [phone]. Use sunscreen when going in bright sun while on Ciprofloxacin or Levofloxacin as these antibiotics can cause photosensitivity. Take 1 hour before or 2 hour after dairy, calcium, iron, magnesium, aluminum or zinc.      TIME SPENT TODAY:     - Spent over 37 minutes on visit (including interval history, physical exam, review of data including labs, cultures, imaging, development and implementation of treatment plan and coordination of complex care). More than 50 percent of this includes face-to-face time spent with the patient for counseling and coordination of care. Thank you for involving me in the care of your patient. I will continue to follow. If you have anyadditional questions, please do not hesitate to contact me. Subjective: Interval history: Interval history was obtained from chart review and patient/ RN. He is afebrile.   He is tolerating antibiotics okay. No diarrhea     REVIEW OF SYSTEMS:     Review of Systems   Constitutional: Negative for chills, diaphoresis and fever. HENT: Negative for ear discharge, ear pain, rhinorrhea, sore throat and trouble swallowing. Eyes: Negative for discharge and redness. Respiratory: Negative for cough, shortness of breath and wheezing. Cardiovascular: Negative for chest pain and leg swelling. Gastrointestinal: Negative for abdominal pain, constipation, diarrhea and nausea. Endocrine: Negative for polyuria. Genitourinary: Negative for dysuria, flank pain, frequency, hematuria and urgency. Musculoskeletal: Negative for back pain and myalgias. Skin: Negative for rash. Neurological: Negative for dizziness, seizures and headaches. Hematological: Does not bruise/bleed easily. Psychiatric/Behavioral: Negative for hallucinations and suicidal ideas. All other systems reviewed and are negative. Past Medical History: All past medical history reviewed today. History reviewed. No pertinent past medical history. Past Surgical History: All past surgical history was reviewed today. History reviewed. No pertinent surgical history. Family History: All family history was reviewed today. Family history unknown: Yes       Objective:       PHYSICAL EXAM:      Vitals:   Vitals:    08/13/21 0000 08/13/21 0400 08/13/21 0600 08/13/21 0700   BP: (!) 152/71 (!) 158/93 (!) 151/110    Pulse: 72 67 86 78   Resp: 16 18 20    Temp: 97.8 °F (36.6 °C) 97.8 °F (36.6 °C)     TempSrc: Temporal Temporal     SpO2: 95% 92% 92%    Weight:  168 lb 10.4 oz (76.5 kg)     Height:         Physical Exam  Vitals and nursing note reviewed. Constitutional:       Appearance: Normal appearance. He is well-developed. HENT:      Head: Normocephalic and atraumatic. Right Ear: External ear normal.      Left Ear: External ear normal.      Nose: Nose normal. No congestion or rhinorrhea. Mouth/Throat:      Mouth: Mucous membranes are moist.      Pharynx: No oropharyngeal exudate or posterior oropharyngeal erythema. Eyes:      General: No scleral icterus. Right eye: No discharge. Left eye: No discharge. Conjunctiva/sclera: Conjunctivae normal.      Pupils: Pupils are equal, round, and reactive to light. Cardiovascular:      Rate and Rhythm: Normal rate and regular rhythm. Pulses: Normal pulses. Heart sounds: No murmur heard. No friction rub. Pulmonary:      Effort: Pulmonary effort is normal. No respiratory distress. Breath sounds: Normal breath sounds. No stridor. No wheezing, rhonchi or rales. Abdominal:      General: Bowel sounds are normal.      Palpations: Abdomen is soft. Tenderness: There is no abdominal tenderness. There is no right CVA tenderness, left CVA tenderness, guarding or rebound. Musculoskeletal:         General: No swelling or tenderness. Normal range of motion. Cervical back: Normal range of motion and neck supple. No rigidity. No muscular tenderness. Lymphadenopathy:      Cervical: No cervical adenopathy. Skin:     General: Skin is warm and dry. Coloration: Skin is not jaundiced. Findings: No erythema or rash. Neurological:      General: No focal deficit present. Mental Status: He is alert and oriented to person, place, and time. Mental status is at baseline. Motor: No abnormal muscle tone. Psychiatric:         Mood and Affect: Mood normal.         Behavior: Behavior normal.         Thought Content: Thought content normal.         Lines and drains: All vascular access sites are healthy with no local erythema, discharge or tenderness. Intake and output:    I/O last 3 completed shifts: In: 572.9 [P.O.:480; I.V.:10; IV Piggyback:82.9]  Out: 650 [Urine:650]    Lab Data:   All available labs and old records have been reviewed by me.     CBC:  Recent Labs     08/11/21  0649 08/12/21  4620 08/13/21  0441   WBC 37.5* 17.0* 9.1   RBC 4.12* 3.95* 4.06*   HGB 11.1* 10.9* 11.2*   HCT 34.4* 33.2* 34.2*    272 314   MCV 83.5 84.2 84.3   MCH 27.0 27.7 27.4   MCHC 32.3 32.9 32.6   RDW 18.8* 19.2* 19.1*        BMP:  Recent Labs     08/11/21  0649 08/12/21  0450 08/13/21  0441    138 139   K 3.7 3.7 4.1    104 104   CO2 23 24 25   BUN 4* 7 11   CREATININE 0.6* 0.6* 0.7*   CALCIUM 8.5 8.5 8.7   GLUCOSE 89 116* 105*        Hepatic Function Panel:   Lab Results   Component Value Date    ALKPHOS 97 08/13/2021    ALT 18 08/13/2021    AST 28 08/13/2021    PROT 7.1 08/13/2021    BILITOT <0.2 08/13/2021    LABALBU 3.0 08/13/2021       CPK: No results found for: CKTOTAL  ESR:   Lab Results   Component Value Date    SEDRATE 98 (H) 08/11/2021     CRP:   Lab Results   Component Value Date    .1 (H) 08/11/2021           Imaging: All pertinent images and reports for the current visit were reviewed by me during this visit. XR ANKLE RIGHT (MIN 3 VIEWS)   Final Result   No acute osseous abnormality of the right ankle. CT CHEST WO CONTRAST   Final Result   1. Findings most consistent with mild interstitial edema with interlobular   septal thickening, trace bilateral effusions and mild dependent ground-glass   opacification in the upper lobes. No focal infiltrate, mass or significant   airways disease. 2. Mild cardiomegaly. 3. Mild infiltration of the subcutaneous fat consistent with mild   subcutaneous edema. CT ABDOMEN PELVIS W IV CONTRAST Additional Contrast? None   Final Result   Mild dilation the proximal jejunum. This is nonspecific focal ileus versus   enteritis. No urolithiasis or obstructive uropathy. Cardiomegaly. Mild enlargement of the prostate gland. US DUP ABD PEL RETRO SCROT COMPLETE   Final Result   No intratesticular masses. Findings consistent with right epididymo-orchitis. Small bilateral hydroceles.          [address] TESTICLES   Final Result   No intratesticular masses. Findings consistent with right epididymo-orchitis. Small bilateral hydroceles. Medications: All current and past medications were reviewed.  ciprofloxacin  500 mg Oral 2 times per day    metroNIDAZOLE  500 mg Oral 3 times per day    pantoprazole  40 mg Oral QAM AC    sodium chloride flush  5-40 mL Intravenous 2 times per day    enoxaparin  40 mg Subcutaneous Daily    nicotine  1 patch Transdermal Daily    covid-19 vaccine  1 Dose Intramuscular Prior to discharge        sodium chloride Stopped (08/12/21 0401)       perflutren lipid microspheres, sodium chloride flush, sodium chloride, ondansetron **OR** ondansetron, polyethylene glycol, acetaminophen **OR** acetaminophen, aluminum & magnesium hydroxide-simethicone, calcium carbonate, ketorolac      Problem list:       Patient Active Problem List   Diagnosis Code    Epididymo-orchitis N45.3    Pain in right testicle N50.811    Septicemia ([facility] [address] [facility] A41.9    Cocaine abuse ([facility] [address] [facility] F14.10    Screen for STD (sexually transmitted disease) Z11.3    Screening for HIV (human immunodeficiency virus) Z11.4    Need for hepatitis B screening test Z11.59    Encounter for hepatitis C virus screening test for high risk patient Z11.59, Z91.89    E. coli UTI (urinary tract infection) N39.0, B96.20    Need for diphtheria-tetanus-pertussis (Tdap) vaccine O85    Metabolic encephalopathy V78.29    Elevated sed rate R70.0    Elevated C-reactive protein (CRP) R79.82       Please note that this chart was generated using Dragon dictation software. Although every effort was made to ensure the accuracy of this automated transcription, some errors in transcription may have occurred inadvertently. If you may need any clarification, please do not hesitate to contact me through EPIC or at the phone number provided below with my electronic signature.   Any pictures or media included in this note were obtained after taking informed verbal consent from the patient and with their approval to include those in the patient's medical record.     Alison Lora MD, MPH  8/13/2021 , 3:11 PM   [facility] Infectious Disease   [address] [place] [address]  Office: [phone]  Fax: [phone]  Clinic days:  Tuesday & Thursday

## 2021-08-13 NOTE — PROGRESS NOTES
Nursing reassessment. Afebrile. VSS.  NSR. No acute changes at this time. See complex flowsheet for Vital Signs and specific data. SR up x3. Call light in reach. Testicular edema reducing. Patient has no c/o of pain at this time. Thankful to nurse. No talk about signing out AMA.

## 2021-08-13 NOTE — PROGRESS NOTES
Discharge instructions and medications given to patient and his significant other. Understanding voiced. patient getting dressed for discharge.

## 2021-08-13 NOTE — PLAN OF CARE
Problem: Falls - Risk of:  Goal: Will remain free from falls  Description: Will remain free from falls  8/12/2021 2335 by Claudia Fierro RN  Outcome: Ongoing  8/12/2021 1336 by Pop Ames RN  Outcome: Ongoing     Problem: Pain:  Goal: Pain level will decrease  Description: Pain level will decrease  8/12/2021 2335 by Claudia Fierro RN  Outcome: Ongoing  8/12/2021 1336 by Pop Ames RN  Outcome: Ongoing

## 2021-08-13 NOTE — DISCHARGE INSTR - COC
Continuity of Care Form    Patient Name: Betsy Taylor   :  1965  MRN:  [id number]    Admit date:  8/10/2021  Discharge date:  ***    Code Status Order: Full Code   Advance Directives:     Admitting Physician:  Yunior James MD  PCP: Pastor Acosta    Discharging Nurse: [facility] Unit/Room#: [id number]-[id number]/4703-80  Discharging Unit Phone Number: ***    Emergency Contact:   Extended Emergency Contact Information  Primary Emergency Contact: Siria Hardy  Address: [address] [place] [address] [place] [address]  Home Phone: [phone]  Relation: Aunt/Uncle  Secondary Emergency Contact: Christian Kim  Mobile Phone: [phone]  Relation: Child  Preferred language: English   needed? No    Past Surgical History:  History reviewed. No pertinent surgical history.     Immunization History:   Immunization History   Administered Date(s) Administered    Tdap (Boostrix, Adacel) 2021       Active Problems:  Patient Active Problem List   Diagnosis Code    Epididymo-orchitis N45.3    Pain in right testicle N50.811    Septicemia ([facility] [address] A41.9    Cocaine abuse ([facility] [address] F14.10    Screen for STD (sexually transmitted disease) Z11.3    Screening for HIV (human immunodeficiency virus) Z11.4    Need for hepatitis B screening test Z11.59    Drug abuse counseling and surveillance of drug abuser C56.41    Complicated UTI (urinary tract infection) N39.0    Need for diphtheria-tetanus-pertussis (Tdap) vaccine L50    Metabolic encephalopathy C44.05    Elevated sed rate R70.0    Elevated C-reactive protein (CRP) R79.82       Isolation/Infection:   Isolation          No Isolation        Patient Infection Status     None to display          Nurse Assessment:  Last Vital Signs: BP (!) 159/75   Pulse 69   Temp 98.4 °F (36.9 °C) (Temporal)   Resp 25   Ht 5' 7\" (1.702 m)   Wt 168 lb 10.4 oz (76.5 kg)   SpO2 94%   BMI 26.41 kg/m²     Last documented pain score (0-10 scale): Pain Level: 0  Last Weight:   Wt Readings from Last 1 Encounters:   21 168 lb 10.4 oz (76.5 kg)     Mental Status:  {IP PT MENTAL STATUS:}    IV Access:  [address] IV ACCESS:299121550}    Nursing Mobility/ADLs:  Walking   {CHP DME NXRA:143819428}  Transfer  {CHP DME JXJ}  Bathing  {CHP DME XRCR:539209738}  Dressing  {CHP DME MWCY:262921776}  Toileting  {CHP DME KNIA:261834699}  Feeding  {CHP DME JYQB:399790393}  Med Admin  {CHP DME ZPPN:394156641}  Med Delivery   {[facility] MED Delivery:658362450}    Wound Care Documentation and Therapy:        Elimination:  Continence:   · Bowel: {YES / ST:94524}  · Bladder: {YES / DH:68293}  Urinary Catheter: {Urinary Catheter:790341722}   Colostomy/Ileostomy/Ileal Conduit: {YES / EW:50347}       Date of Last BM: ***    Intake/Output Summary (Last 24 hours) at 2021 1752  Last data filed at 2021 1430  Gross per 24 hour   Intake 1482.78 ml   Output 2250 ml   Net -767.22 ml     I/O last 3 completed shifts: In: 1482.8 [P.O.:1340;  I.V.:10; IV Piggyback:132.8]  Out: 2250 [Urine:2250]    Safety Concerns:     [address] Safety Concerns:791301652}    Impairments/Disabilities:      [address] Impairments/Disabilities:597059565}    Nutrition Therapy:  Current Nutrition Therapy:   [address] Diet List:850027969}    Routes of Feeding: {P DME Other Feedings:280750861}  Liquids: {Slp liquid thickness:28330}  Daily Fluid Restriction: {CHP DME Yes amt example:050643425}  Last Modified Barium Swallow with Video (Video Swallowing Test): {Done Not Done ZAOC:751786434}    Treatments at the Time of Hospital Discharge:   Respiratory Treatments: ***  Oxygen Therapy:  {Therapy; copd oxygen:85990}  Ventilator:    { CC Vent UUD}    Rehab Therapies: {THERAPEUTIC INTERVENTION:7199670301}  Weight Bearing Status/Restrictions: [address] Weight Bearin}  Other Medical Equipment (for information only, NOT a DME order):  {EQUIPMENT:135867131}  Other Treatments: ***    Patient's personal belongings (please select all that are sent with patient):  {CHP DME Belongings:818486746}    RN SIGNATURE:  {Esignature:480237057}    CASE MANAGEMENT/SOCIAL WORK SECTION    Inpatient Status Date: ***    Readmission Risk Assessment Score:  Readmission Risk              Risk of Unplanned Readmission:  10           Discharging to Facility/ Agency   · Name:   · Address:  · Phone:  · Fax:    Dialysis Facility (if applicable)   · Name:  · Address:  · Dialysis Schedule:  · Phone:  · Fax:    / signature: {Esignature:136741084}    PHYSICIAN SECTION    Prognosis: {Prognosis:2524878775}    Condition at Discharge: [address] [place] [address] Patient Condition:794725133}    Rehab Potential (if transferring to Rehab): {Prognosis:7670584145}    Recommended Labs or Other Treatments After Discharge: ***    Physician Certification: I certify the above information and transfer of Kelsey Palacios  is necessary for the continuing treatment of the diagnosis listed and that he requires {Admit to Appropriate Level of Care:16235} for {GREATER/LESS:523419399} 30 days.      Update Admission H&P: {CHP DME Changes in RXUXB:685129679}    PHYSICIAN SIGNATURE:  {Esignature:210723807}

## 2021-08-13 NOTE — PROGRESS NOTES
Physical Therapy    Facility/Department: [facility] ICU  Initial Assessment    NAME: Olivier West  : 1965  MRN: [id number]    Date of Service: 2021    Discharge Recommendations:    Olivier West scored a 24/24 on the AM-PAC short mobility form. At this time, no further PT is recommended upon discharge due to presenting at baseline mobility and independence. Recommend patient returns to prior setting with prior services. Assessment   Assessment: Pt presents at baseline functional mobility and independence and does not need continued skilled PT. Treatment Diagnosis: epididmyo-orchitis  Prognosis: Good  Decision Making: Low Complexity  PT Education: Goals;PT Role;Plan of Care  Patient Education: pt verbalized understanding  Barriers to Learning: none  No Skilled PT: At baseline function  REQUIRES PT FOLLOW UP: No  Activity Tolerance  Activity Tolerance: Patient Tolerated treatment well  Activity Tolerance: pt tolerataed tx       Patient Diagnosis(es): The primary encounter diagnosis was Orchitis and epididymitis. Diagnoses of Pain in right testicle and Septicemia [facility]) were also pertinent to this visit. has no past medical history on file. has no past surgical history on file. Restrictions  Restrictions/Precautions  Restrictions/Precautions: Fall Risk  Required Braces or Orthoses?: No  Position Activity Restriction  Other position/activity restrictions: Olivier West is a 60 y.o. male  who presents to the ED complaining of 2 to 3 days of progressively worsening significant right testicular pain radiating to the right lower quadrant of the abdomen. He denies any injury or trauma to the area. No penile discharge or concern for STD. Denies any dysuria hematuria or urinary retention. He has no pain on the left testicle but the right testicle is notably swollen with hemiscrotal edema on the right as well. He has never had anything like this before. He denies any diarrhea or constipation.   Has had some nausea and sometimes vomiting as well. He was not sure about fevers at home but is febrile at 102 in triage. No cough or cold symptoms.   Vision/Hearing  Vision: Within Functional Limits  Hearing: Within functional limits     Subjective  General  Chart Reviewed: Yes  Patient assessed for rehabilitation services?: Yes  Family / Caregiver Present: No  Diagnosis: epidydimy-orchitis  Follows Commands: Within Functional Limits  Other (Comment): pt sitting in bed upright upon arrival  General Comment  Comments: pt agreeable to therapy  Subjective  Subjective: pt denies pain  Pain Screening  Patient Currently in Pain: No  Vital Signs  Patient Currently in Pain: No       Orientation  Orientation  Overall Orientation Status: Within Normal Limits  Social/Functional History  Social/Functional History  Lives With: Significant other  Type of Home: House  Home Layout: One level (Ranch)  Home Access: Level entry  Entrance Stairs - Number of Steps: ranch no steps  Entrance Stairs - Rails: None  Bathroom Shower/Tub: Tub/Shower unit  Bathroom Toilet: Standard  Bathroom Accessibility: Accessible  Receives Help From: Family  ADL Assistance: Independent  Homemaking Assistance: Independent  Homemaking Responsibilities: Yes  Ambulation Assistance: Independent  Transfer Assistance: Independent  Active : Yes  Mode of Transportation: Truck  Additional Comments: No falls in past 6 months  Cognition   Cognition  Overall Cognitive Status: WNL    Objective     Observation/Palpation  Posture: Good    AROM RLE (degrees)  RLE AROM: WNL  AROM LLE (degrees)  LLE AROM : WNL  AROM RUE (degrees)  RUE AROM : WNL  AROM LUE (degrees)  LUE AROM : WNL  Strength RLE  Strength RLE: WFL  Strength LLE  Strength LLE: WFL  Strength RUE  Strength RUE: WFL  Strength LUE  Strength LUE: WFL     Sensation  Overall Sensation Status: WNL  Bed mobility  Supine to Sit: Independent  Sit to Supine: Independent  Scooting: Independent  Transfers  Sit to Stand: Independent  Stand to sit: Independent  Ambulation  Ambulation?: Yes  More Ambulation?: No  Ambulation 1  Surface: level tile  Device: No Device  Assistance: Independent  Gait Deviations: Increased MICHELLE  Distance: 270ft in hallway  Comments: pt presents at baseline  Stairs/Curb  Stairs?: No     Balance  Posture: Good  Sitting - Static: Good  Sitting - Dynamic: Good  Standing - Static: Good  Standing - Dynamic: Good        Plan   Plan  Times per week: d/c  Safety Devices  Type of devices: None  Restraints  Initially in place: No    AM-PAC Score  AM-PAC Inpatient Mobility Raw Score : 24 (08/13/21 1553)  AM-PAC Inpatient T-Scale Score : 61.14 (08/13/21 1553)  Mobility Inpatient CMS 0-100% Score: 0 (08/13/21 1553)  Mobility Inpatient CMS G-Code Modifier : CH (08/13/21 1553)     Goals  Short term goals  Time Frame for Short term goals: d/c  Patient Goals   Patient goals : return home       Therapy Time   Individual Concurrent Group Co-treatment   Time In 1528         Time Out 1546         Minutes 18         Timed Code Treatment Minutes: 0 Minutes     DANN Haley PT Therapist observed and directed the above evaluation.  Thanks, Yoli Corrales, DPT [id number]

## 2021-08-13 NOTE — PROGRESS NOTES
Asked by Dr. Kael Hernandez to find out why podiatry consult was not done. According to patient,Dr. Janel Yarbrough instructed him to follow up with himself;ID, and a Podiatrist after he's been on the antibiotics for a week. Will provide patient with Dr. Reed Lawrence office number and follow-up instructions.

## 2021-08-13 NOTE — PROGRESS NOTES
Occupational Therapy   Occupational Therapy Initial Assessment  Date: 2021   Patient Name: Zenia Lopez  MRN: [id number]     : 1965    Date of Service: 2021    Discharge Recommendations: Zenia Lopez scored a 24/24 on the -[facility] ADL Inpatient form. At this time, no further OT is recommended upon discharge due to patient presenting at baseline functioning level. Recommend patient returns to prior setting with prior services. OT Equipment Recommendations  Equipment Needed: No    Assessment   Prognosis: Good  Decision Making: Low Complexity  OT Education: OT Role;Plan of Care  Patient Education: Patient verbalized understanding  REQUIRES OT FOLLOW UP: No  Activity Tolerance  Activity Tolerance: Patient Tolerated treatment well  Safety Devices  Safety Devices in place: Yes  Type of devices: All fall risk precautions in place;Nurse notified; Left in bed;Call light within reach           Patient Diagnosis(es): The primary encounter diagnosis was Orchitis and epididymitis. Diagnoses of Pain in right testicle and Septicemia [facility]) were also pertinent to this visit. has no past medical history on file. has no past surgical history on file. Restrictions  Restrictions/Precautions  Restrictions/Precautions: Fall Risk  Required Braces or Orthoses?: No  Position Activity Restriction  Other position/activity restrictions: Zenia Lopez is a 60 y.o. male  who presents to the ED complaining of 2 to 3 days of progressively worsening significant right testicular pain radiating to the right lower quadrant of the abdomen. He denies any injury or trauma to the area. No penile discharge or concern for STD. Denies any dysuria hematuria or urinary retention. He has no pain on the left testicle but the right testicle is notably swollen with hemiscrotal edema on the right as well. He has never had anything like this before. He denies any diarrhea or constipation.   Has had some nausea and sometimes vomiting as well. He was not sure about fevers at home but is febrile at 102 in triage. No cough or cold symptoms. Subjective   General  Chart Reviewed: Yes  Patient assessed for rehabilitation services?: Yes  Family / Caregiver Present: No  Subjective  Subjective: Patient supine in bed upon arrival, agreeable to therapy evaluation  Patient Currently in Pain: No  Pain Assessment  Pain Assessment: 0-10  Pain Level: 0  Vital Signs  Patient Currently in Pain: No  Social/Functional History  Social/Functional History  Lives With: Significant other  Type of Home: House  Home Layout: One level [facility])  Home Access: Level entry  Entrance Stairs - Number of Steps: ranch no steps  Entrance Stairs - Rails: None  Bathroom Shower/Tub: Tub/Shower unit  Bathroom Toilet: Standard  Bathroom Accessibility: Accessible  Receives Help From: Family  ADL Assistance: Independent  Homemaking Assistance: Independent  Homemaking Responsibilities: Yes  Ambulation Assistance: Independent  Transfer Assistance: Independent  Active : Yes  Mode of Transportation: Truck  Additional Comments: No falls in past 6 months       Objective   Vision: Within Functional Limits  Hearing: Within functional limits    Orientation  Overall Orientation Status: Within Functional Limits  Observation/Palpation  Posture: Good  Balance  Sitting Balance: Independent  Standing Balance: Independent  Standing Balance  Time: ~10 minutes  Activity: Functional transfers, functional mobility, ADL completion  Functional Mobility  Functional - Mobility Device: No device  Activity: To/from bathroom; Other  Assist Level: Independent  Functional Mobility Comments: Patient ambulated ~275ft in hallway and room  ADL  Grooming: Independent (I washing hands in stance at sink)  LE Dressing: Independent (I donning brief)  Toileting: Independent (I toileting tasks, toilet hygiene)  Additional Comments: Patient declined any further ADLs.   Tone RUE  RUE Tone: Normotonic  Tone LUE  LUE Tone: Normotonic  Coordination  Movements Are Fluid And Coordinated: Yes     Bed mobility  Supine to Sit: Independent  Sit to Supine: Independent  Scooting: Independent  Transfers  Sit to stand: Independent  Stand to sit: Independent  Vision - Basic Assessment  Prior Vision: No visual deficits  Visual History: No significant visual history  Patient Visual Report: No visual complaint reported.   Cognition  Overall Cognitive Status: WFL        Sensation  Overall Sensation Status: WNL        LUE AROM (degrees)  LUE AROM : WFL  Left Hand AROM (degrees)  Left Hand AROM: WFL  RUE AROM (degrees)  RUE AROM : WFL  Right Hand AROM (degrees)  Right Hand AROM: [facility]        AM-PAC Score   AM-[facility] Inpatient Daily Activity Raw Score: 24 (08/13/21 1555)  AM-PAC Inpatient ADL T-Scale Score : 57.54 (08/13/21 1555)  ADL Inpatient CMS 0-100% Score: 0 (08/13/21 1555)  ADL Inpatient CMS G-Code Modifier : CH (08/13/21 1555)    Goals  Short term goals  Time Frame for Short term goals: Eval and d/c at baseline       Therapy Time   Individual Concurrent Group Co-treatment   Time In       1528   Time Out       1546   Minutes       18        Timed Code Treatment Minutes:  3 Minutes    Total Treatment Minutes:  18 minutes       BELEN Rosenberg/MADISYN [id number]

## 2021-08-13 NOTE — PROGRESS NOTES
Nursing reassessment. Patient called nurse into his room stating, \"Where's my clothes. \" on call light. Entered patient's room. His girlfriend is sitting in [address] next to patient's bed. Girlfriend of patient tells this nurse, \"I told him that I took his clothes home. \"  Patient asks nurse how long his care is going to take and when can he be discharged. Tells nurse that if he had his clothes he might sign out AMA. Reviewed his Abx treatment and plan of care.

## 2021-08-13 NOTE — PROGRESS NOTES
Nursing assessment. VSS.  98.7. Afebrile. NSR. HR 66. /51 (68). Rates groin/genital pain 8 out of 10 and states he can probably tolerate 3 out of 10. Will medicate for pain. Provided nourishment and fresh fluids. Flat affect. Patient reports he is starting to feel better. Patient sharing his feelings/thoughts re: his illness. Nurse listened. Abx IVPB's scheduled. Explained plan of care. Reviewed call light system. Provided warm blankets and fresh ice water and ice. LS few scattered wheezes anterior and diminished posterior lobes. ABD soft +BSx4. Skin warm very dry and natural.  Pulses +ppp. Turns ad aiden in bed. HOB up 30 degrees. SR up x3. Call light in reach. Bed at lowest position. Wheels bed locked. Bed alarm activated. Patient's daughter at bedside. No acute distress at this time.

## 2021-08-13 NOTE — PROGRESS NOTES
CLINICAL PHARMACY NOTE: MEDS TO BEDS    Total # of Prescriptions Filled: 3   The following medications were delivered to the patient:  · Nicotine 7mg patches  · Metronidazole 500mg  · Ciprofloxacin 500mg    Additional Documentation:  Medications delivered- BUFFY Chirinos signed  Joaquín Hampton

## 2021-08-13 NOTE — PROGRESS NOTES
Urology Progress Note  [facility]    Provider: CARINE De Leon CNP  Patient ID:  Admission Date: 8/10/2021 Name: Fabiana Monson  OR Date: 8/10/2021 MRN: [id number]   Patient Location: [id number] : 1965  Attending: Jesenia Agrawal MD Date of Service: 2021  PCP: Shannan Montoya     Diagnoses:  Epididymo-Orchitis     Assessment/Plan:  Epididymo-Orchitis   Leukocytosis 37.5 on admission now 16  FAUSTO with no abscess or tara's  E.coli uti/bacteremia  Mentioned he has had [address] off and on for last year  -Smoking history   Exam today shows improvement of scrotal swelling, epididymus still enlarged, but improved, does not complain of pain, some minimal tenderness. Recommendations:  -Continue abx, appreciate ID recommendations  -He will need to f/u to evaluate resolution of infection and for a gross hematuria workup- requested  -OK to discharge per  when medically stable    The patient had a chance to ask questions which were answered. he understands the above plan. Subjective:   Fabiana Monson is a 60 y.o. male. He was seen and examined this morning. Today I stopped by patients room to discuss the gross hematuria he has been having over the last year. Discussed [address] workup including cysto, imaging, and cystology and fish. Discussed risks of bladder cancer given his hx of smoking. He seems to understand and had a chance to ask questions.     Objective:   Vitals:  Vitals:    21 0700   BP:    Pulse: 78   Resp:    Temp:    SpO2:        Intake/Output Summary (Last 24 hours) at 2021 1019  Last data filed at 2021 0447  Gross per 24 hour   Intake 2003.19 ml   Output 650 ml   Net 1353.19 ml     Physical Exam:  Gen: Alert and oriented x3, no acute distress  CV: Regular rate   Resp: unlabored respirations  Abd: Soft, non-distended, non-tender, no masses  :  Exam today shows improvement of scrotal swelling, epididymus still enlarged, but improved, does not complain of pain, some minimal tenderness. Ext: no peripheral edema noted, moves upper and lower extremities spontaneously  Skin: warmand well perfused, no rashes noted on the face, or arms.      Labs:  Lab Results   Component Value Date    WBC 9.1 08/13/2021    HGB 11.2 (L) 08/13/2021    HCT 34.2 (L) 08/13/2021    MCV 84.3 08/13/2021     08/13/2021     Lab Results   Component Value Date    CREATININE 0.7 (L) 08/13/2021    BUN 11 08/13/2021     08/13/2021    K 4.1 08/13/2021     08/13/2021    CO2 25 08/13/2021       CARINE Ponce - CNP   8/13/2021

## 2021-08-13 NOTE — DISCHARGE INSTR - DIET

## 2021-08-14 LAB
BLOOD CULTURE, ROUTINE: NORMAL
CULTURE, BLOOD 2: ABNORMAL
CULTURE, BLOOD 2: ABNORMAL
HEPATITIS C VIRUS AB BY CIA INDEX: 0.04 IV
HEPATITIS C VIRUS AB BY CIA INTERPRETATION: NEGATIVE
ORGANISM: ABNORMAL
ORGANISM: ABNORMAL

## 2021-08-15 NOTE — DISCHARGE SUMMARY
Hospital Medicine Discharge Summary    Patient ID: Jhony aB      Patient's PCP: Jim Sauceda    Admit Date: 8/10/2021     Discharge Date: 8/13/2021      Admitting Physician: Brandy Frank MD     Discharge Physician: Derrick Alston MD     Discharge Diagnoses: Active Hospital Problems    Diagnosis     Elevated sed rate [R70.0]     Elevated C-reactive protein (CRP) [R79.82]     Pain in right testicle [N50.811]     Septicemia ([address] [A41.9]     Cocaine abuse ([address] [F14.10]     Screen for STD (sexually transmitted disease) [Z11.3]     Screening for HIV (human immunodeficiency virus) [Z11.4]     Need for hepatitis B screening test [Z11.59]     Drug abuse counseling and surveillance of drug abuser [I30.07]     Complicated UTI (urinary tract infection) [N39.0]     Need for diphtheria-tetanus-pertussis (Tdap) vaccine [U85]     Metabolic encephalopathy [M81.67]     Epididymo-orchitis [N45.3]        The patient was seen and examined on day of discharge and this discharge summary is in conjunction with any daily progress note from day of discharge. Hospital Course:     60 y. o. male with no significant past medical history, who denies sexual intercourse with multiple partners, who presents to the emergency room with complaints of right testicular swelling, pain, ongoing for the past 3 days. Keith Hurst was noted to have fever as high as 102 °F in the emergency room.  Scrotal ultrasound is consistent with right epididymo-orchitis.  Patient is noted to be confused, lethargic at the time of evaluation.  Much of HPI obtained from review of electronic medical records as patient is unable to provide relevant HPI due to lethargy.  Patient is being admitted for further management.      Sepsis-improving  POA  Secondary to epididymoorchitis  WBC worsening in spite of Rocephin Levaquin  Cultures 1 bottle staph epi in the blood  Urine positive for E. coli, sensitivity pending  Urology on board  Infectious Component Value Date     08/13/2021    K 4.1 08/13/2021     08/13/2021    CO2 25 08/13/2021    BUN 11 08/13/2021    CREATININE 0.7 08/13/2021    CALCIUM 8.7 08/13/2021         Significant Diagnostic Studies    Radiology:   XR ANKLE RIGHT (MIN 3 VIEWS)   Final Result   No acute osseous abnormality of the right ankle. CT CHEST WO CONTRAST   Final Result   1. Findings most consistent with mild interstitial edema with interlobular   septal thickening, trace bilateral effusions and mild dependent ground-glass   opacification in the upper lobes. No focal infiltrate, mass or significant   airways disease. 2. Mild cardiomegaly. 3. Mild infiltration of the subcutaneous fat consistent with mild   subcutaneous edema. CT ABDOMEN PELVIS W IV CONTRAST Additional Contrast? None   Final Result   Mild dilation the proximal jejunum. This is nonspecific focal ileus versus   enteritis. No urolithiasis or obstructive uropathy. Cardiomegaly. Mild enlargement of the prostate gland. US DUP ABD PEL RETRO SCROT COMPLETE   Final Result   No intratesticular masses. Findings consistent with right epididymo-orchitis. Small bilateral hydroceles. US SCROTUM AND TESTICLES   Final Result   No intratesticular masses. Findings consistent with right epididymo-orchitis. Small bilateral hydroceles.                 Consults:     IP CONSULT TO UROLOGY  IP CONSULT TO HOSPITALIST  IP CONSULT TO INFECTIOUS DISEASES  IP CONSULT TO PODIATRY    Disposition: Home    Condition at Discharge: Stable    Discharge Instructions/Follow-up: PCP  Infectious disease  Podiatry    Code Status:  Prior     Activity: activity as tolerated    Diet: regular diet      Discharge Medications:     Discharge Medication List as of 8/13/2021  6:08 PM           Details   ciprofloxacin (CIPRO) 500 MG tablet Take 1 tablet by mouth every 12 hours for 10 days, Disp-20 tablet, R-0Normal      metroNIDAZOLE (FLAGYL) 500 MG tablet Take 1 tablet by mouth every 8 hours for 10 days, Disp-30 tablet, R-0Normal      nicotine (NICODERM CQ) 7 MG/24HR Place 1 patch onto the skin daily, Disp-30 patch, R-0Normal      COVID-19 Ad26 Vaccine (J&J, [employer]knowledge) 0.5 ML SUSP injection Inject 0.5 mLs into the muscle once for 1 dose, Disp-0.5 mL, R-0NO PRINT      miconazole nitrate 2 % OINT Apply twice daily between all toes, Disp-1 Tube, R-5, Normal              Details   Lansoprazole (PREVACID PO) Take by mouthHistorical Med             Time Spent on discharge is more than 30 minutes in the examination, evaluation, counseling and review of medications and discharge plan.       Signed:    Electronically signed by Cydney Smart MD on 8/15/2021 at 4:28 PM

## 2021-08-16 LAB — BLOOD CULTURE, ROUTINE: NORMAL

## 2021-08-16 NOTE — ED NOTES
[place]   Emergency Department Culture Follow-Up       Eladia Smith (CSN: [id number]) was seen and evaluated at [facility] Emergency Department on 8/10/21 by provider  Dr. Carleen Dhillon. A blood culture was positive and is growing 1/2 Staphylococcus epidermidis. Sensitivity results: N/A      Treatment Course: The patient was admitted to the hospital and evaluated under the infectious disease service. On 8/13/21, Dr. Ronald Orozco notes the culture result is likely a skin contaminant and not acute infection. Follow-Up:    No additional follow-up necessary at this time.      Thank you,    Aki Frost, PharmD  ED Pharmacist [id number]  8/16/2021